# Patient Record
Sex: FEMALE | Race: BLACK OR AFRICAN AMERICAN | NOT HISPANIC OR LATINO | Employment: UNEMPLOYED | RURAL
[De-identification: names, ages, dates, MRNs, and addresses within clinical notes are randomized per-mention and may not be internally consistent; named-entity substitution may affect disease eponyms.]

---

## 2020-11-23 ENCOUNTER — HISTORICAL (OUTPATIENT)
Dept: ADMINISTRATIVE | Facility: HOSPITAL | Age: 20
End: 2020-11-23

## 2020-11-24 LAB — RPR SER-TITR: ABNORMAL {TITER}

## 2021-05-10 ENCOUNTER — HOSPITAL ENCOUNTER (EMERGENCY)
Facility: HOSPITAL | Age: 21
Discharge: HOME | End: 2021-05-11
Payer: MEDICAID

## 2021-05-10 DIAGNOSIS — H65.02 ACUTE SEROUS OTITIS MEDIA OF LEFT EAR, RECURRENCE NOT SPECIFIED: Primary | ICD-10-CM

## 2021-05-10 LAB
B-HCG UR QL: NEGATIVE
CTP QC/QA: YES

## 2021-05-10 PROCEDURE — 99284 EMERGENCY DEPT VISIT MOD MDM: CPT | Mod: 25

## 2021-05-10 PROCEDURE — 99283 EMERGENCY DEPT VISIT LOW MDM: CPT | Mod: ,,, | Performed by: NURSE PRACTITIONER

## 2021-05-10 PROCEDURE — 99283 PR EMERGENCY DEPT VISIT,LEVEL III: ICD-10-PCS | Mod: ,,, | Performed by: NURSE PRACTITIONER

## 2021-05-10 PROCEDURE — 81025 URINE PREGNANCY TEST: CPT | Performed by: NURSE PRACTITIONER

## 2021-05-10 PROCEDURE — 96372 THER/PROPH/DIAG INJ SC/IM: CPT

## 2021-05-10 PROCEDURE — 63600175 PHARM REV CODE 636 W HCPCS: Performed by: NURSE PRACTITIONER

## 2021-05-10 RX ORDER — CEFTRIAXONE 1 G/1
1 INJECTION, POWDER, FOR SOLUTION INTRAMUSCULAR; INTRAVENOUS
Status: COMPLETED | OUTPATIENT
Start: 2021-05-10 | End: 2021-05-10

## 2021-05-10 RX ORDER — CEFDINIR 300 MG/1
300 CAPSULE ORAL 2 TIMES DAILY
Qty: 20 CAPSULE | Refills: 0 | Status: SHIPPED | OUTPATIENT
Start: 2021-05-10 | End: 2021-05-20

## 2021-05-10 RX ORDER — METHYLPREDNISOLONE 4 MG/1
TABLET ORAL
Qty: 1 PACKAGE | Refills: 0 | Status: SHIPPED | OUTPATIENT
Start: 2021-05-10 | End: 2021-05-31

## 2021-05-10 RX ORDER — KETOROLAC TROMETHAMINE 30 MG/ML
30 INJECTION, SOLUTION INTRAMUSCULAR; INTRAVENOUS
Status: COMPLETED | OUTPATIENT
Start: 2021-05-10 | End: 2021-05-10

## 2021-05-10 RX ADMIN — CEFTRIAXONE SODIUM 1 G: 1 INJECTION, POWDER, FOR SOLUTION INTRAMUSCULAR; INTRAVENOUS at 11:05

## 2021-05-10 RX ADMIN — KETOROLAC TROMETHAMINE 30 MG: 30 INJECTION, SOLUTION INTRAMUSCULAR; INTRAVENOUS at 11:05

## 2021-05-11 VITALS
SYSTOLIC BLOOD PRESSURE: 134 MMHG | BODY MASS INDEX: 23.92 KG/M2 | DIASTOLIC BLOOD PRESSURE: 90 MMHG | OXYGEN SATURATION: 99 % | WEIGHT: 130 LBS | TEMPERATURE: 99 F | RESPIRATION RATE: 16 BRPM | HEIGHT: 62 IN | HEART RATE: 68 BPM

## 2021-07-06 ENCOUNTER — OFFICE VISIT (OUTPATIENT)
Dept: FAMILY MEDICINE | Facility: CLINIC | Age: 21
End: 2021-07-06
Payer: MEDICAID

## 2021-07-06 VITALS
DIASTOLIC BLOOD PRESSURE: 98 MMHG | SYSTOLIC BLOOD PRESSURE: 153 MMHG | BODY MASS INDEX: 24.36 KG/M2 | RESPIRATION RATE: 18 BRPM | WEIGHT: 132.38 LBS | HEART RATE: 83 BPM | OXYGEN SATURATION: 100 % | HEIGHT: 62 IN | TEMPERATURE: 98 F

## 2021-07-06 DIAGNOSIS — J32.9 SINUSITIS, UNSPECIFIED CHRONICITY, UNSPECIFIED LOCATION: Primary | ICD-10-CM

## 2021-07-06 PROCEDURE — 96372 PR INJECTION,THERAP/PROPH/DIAG2ST, IM OR SUBCUT: ICD-10-PCS | Mod: ,,, | Performed by: FAMILY MEDICINE

## 2021-07-06 PROCEDURE — 96372 THER/PROPH/DIAG INJ SC/IM: CPT | Mod: ,,, | Performed by: FAMILY MEDICINE

## 2021-07-06 PROCEDURE — 99214 OFFICE O/P EST MOD 30 MIN: CPT | Mod: 25,,, | Performed by: FAMILY MEDICINE

## 2021-07-06 PROCEDURE — 99214 PR OFFICE/OUTPT VISIT, EST, LEVL IV, 30-39 MIN: ICD-10-PCS | Mod: 25,,, | Performed by: FAMILY MEDICINE

## 2021-07-06 RX ORDER — CEFTRIAXONE 1 G/1
1 INJECTION, POWDER, FOR SOLUTION INTRAMUSCULAR; INTRAVENOUS
Status: COMPLETED | OUTPATIENT
Start: 2021-07-06 | End: 2021-07-06

## 2021-07-06 RX ORDER — DEXAMETHASONE SODIUM PHOSPHATE 4 MG/ML
4 INJECTION, SOLUTION INTRA-ARTICULAR; INTRALESIONAL; INTRAMUSCULAR; INTRAVENOUS; SOFT TISSUE
Status: COMPLETED | OUTPATIENT
Start: 2021-07-06 | End: 2021-07-06

## 2021-07-06 RX ORDER — PREDNISONE 20 MG/1
20 TABLET ORAL DAILY
Qty: 5 TABLET | Refills: 0 | Status: SHIPPED | OUTPATIENT
Start: 2021-07-06 | End: 2021-07-11

## 2021-07-06 RX ORDER — AMOXICILLIN AND CLAVULANATE POTASSIUM 875; 125 MG/1; MG/1
1 TABLET, FILM COATED ORAL 2 TIMES DAILY
Qty: 14 TABLET | Refills: 0 | Status: SHIPPED | OUTPATIENT
Start: 2021-07-06 | End: 2021-07-13

## 2021-07-06 RX ADMIN — CEFTRIAXONE 1 G: 1 INJECTION, POWDER, FOR SOLUTION INTRAMUSCULAR; INTRAVENOUS at 02:07

## 2021-07-06 RX ADMIN — DEXAMETHASONE SODIUM PHOSPHATE 4 MG: 4 INJECTION, SOLUTION INTRA-ARTICULAR; INTRALESIONAL; INTRAMUSCULAR; INTRAVENOUS; SOFT TISSUE at 02:07

## 2021-10-16 ENCOUNTER — OFFICE VISIT (OUTPATIENT)
Dept: FAMILY MEDICINE | Facility: CLINIC | Age: 21
End: 2021-10-16
Payer: MEDICAID

## 2021-10-16 VITALS
TEMPERATURE: 98 F | HEART RATE: 85 BPM | SYSTOLIC BLOOD PRESSURE: 120 MMHG | WEIGHT: 132 LBS | HEIGHT: 62 IN | BODY MASS INDEX: 24.29 KG/M2 | RESPIRATION RATE: 20 BRPM | DIASTOLIC BLOOD PRESSURE: 70 MMHG | OXYGEN SATURATION: 98 %

## 2021-10-16 DIAGNOSIS — L03.011 PARONYCHIA OF FINGER OF RIGHT HAND: Primary | ICD-10-CM

## 2021-10-16 DIAGNOSIS — L03.011 CELLULITIS OF FINGER OF RIGHT HAND: ICD-10-CM

## 2021-10-16 PROCEDURE — 99051 MED SERV EVE/WKEND/HOLIDAY: CPT | Mod: ,,, | Performed by: NURSE PRACTITIONER

## 2021-10-16 PROCEDURE — 99051 PR MEDICAL SERVICES, EVE/WKEND/HOLIDAY: ICD-10-PCS | Mod: ,,, | Performed by: NURSE PRACTITIONER

## 2021-10-16 PROCEDURE — 99213 PR OFFICE/OUTPT VISIT, EST, LEVL III, 20-29 MIN: ICD-10-PCS | Mod: ,,, | Performed by: NURSE PRACTITIONER

## 2021-10-16 PROCEDURE — 99213 OFFICE O/P EST LOW 20 MIN: CPT | Mod: ,,, | Performed by: NURSE PRACTITIONER

## 2021-10-16 RX ORDER — MUPIROCIN 20 MG/G
OINTMENT TOPICAL 3 TIMES DAILY
Qty: 22 G | Refills: 0 | Status: SHIPPED | OUTPATIENT
Start: 2021-10-16 | End: 2022-03-29

## 2021-10-16 RX ORDER — CEPHALEXIN 500 MG/1
500 CAPSULE ORAL EVERY 8 HOURS
Qty: 21 CAPSULE | Refills: 0 | Status: SHIPPED | OUTPATIENT
Start: 2021-10-16 | End: 2021-10-23

## 2021-11-11 ENCOUNTER — OFFICE VISIT (OUTPATIENT)
Dept: OBSTETRICS AND GYNECOLOGY | Facility: CLINIC | Age: 21
End: 2021-11-11
Payer: MEDICAID

## 2021-11-11 VITALS
DIASTOLIC BLOOD PRESSURE: 52 MMHG | HEIGHT: 62 IN | OXYGEN SATURATION: 98 % | BODY MASS INDEX: 24.11 KG/M2 | SYSTOLIC BLOOD PRESSURE: 164 MMHG | HEART RATE: 86 BPM | WEIGHT: 131 LBS | RESPIRATION RATE: 16 BRPM

## 2021-11-11 DIAGNOSIS — N60.12 FIBROCYSTIC BREAST CHANGES OF BOTH BREASTS: ICD-10-CM

## 2021-11-11 DIAGNOSIS — R10.2 PELVIC PAIN: ICD-10-CM

## 2021-11-11 DIAGNOSIS — Z72.51 HIGH RISK SEXUAL BEHAVIOR, UNSPECIFIED TYPE: ICD-10-CM

## 2021-11-11 DIAGNOSIS — Z01.419 ENCOUNTER FOR ANNUAL ROUTINE GYNECOLOGICAL EXAMINATION: Primary | ICD-10-CM

## 2021-11-11 DIAGNOSIS — N60.11 FIBROCYSTIC BREAST CHANGES OF BOTH BREASTS: ICD-10-CM

## 2021-11-11 LAB
CANDIDA SPECIES: NEGATIVE
GARDNERELLA: POSITIVE
HIV 1+O+2 AB SERPL QL: NORMAL
SYPHILIS AB INTERPRETATION: REACTIVE
TRICHOMONAS: NEGATIVE

## 2021-11-11 PROCEDURE — 87389 HIV 1 / 2 ANTIBODY: ICD-10-PCS | Mod: ,,, | Performed by: CLINICAL MEDICAL LABORATORY

## 2021-11-11 PROCEDURE — 87510 GARDNER VAG DNA DIR PROBE: CPT | Mod: ,,, | Performed by: CLINICAL MEDICAL LABORATORY

## 2021-11-11 PROCEDURE — 87591 CHLAMYDIA/GONORRHOEAE(GC), PCR: ICD-10-PCS | Mod: ,,, | Performed by: CLINICAL MEDICAL LABORATORY

## 2021-11-11 PROCEDURE — 87660 TRICHOMONAS VAGIN DIR PROBE: CPT | Mod: ,,, | Performed by: CLINICAL MEDICAL LABORATORY

## 2021-11-11 PROCEDURE — 87510 BACTERIAL VAGINOSIS: ICD-10-PCS | Mod: ,,, | Performed by: CLINICAL MEDICAL LABORATORY

## 2021-11-11 PROCEDURE — 86696 HERPES SIMPLEX TYPE 2 TEST: CPT | Mod: ,,, | Performed by: CLINICAL MEDICAL LABORATORY

## 2021-11-11 PROCEDURE — 86592 RPR: ICD-10-PCS | Mod: ,,, | Performed by: CLINICAL MEDICAL LABORATORY

## 2021-11-11 PROCEDURE — 86694 HERPES SIMPLEX 1 & 2 IGM: ICD-10-PCS | Mod: ,,, | Performed by: CLINICAL MEDICAL LABORATORY

## 2021-11-11 PROCEDURE — 86592 SYPHILIS TEST NON-TREP QUAL: CPT | Mod: ,,, | Performed by: CLINICAL MEDICAL LABORATORY

## 2021-11-11 PROCEDURE — 87660 BACTERIAL VAGINOSIS: ICD-10-PCS | Mod: ,,, | Performed by: CLINICAL MEDICAL LABORATORY

## 2021-11-11 PROCEDURE — 87389 HIV-1 AG W/HIV-1&-2 AB AG IA: CPT | Mod: ,,, | Performed by: CLINICAL MEDICAL LABORATORY

## 2021-11-11 PROCEDURE — 87480 CANDIDA DNA DIR PROBE: CPT | Mod: ,,, | Performed by: CLINICAL MEDICAL LABORATORY

## 2021-11-11 PROCEDURE — 86694 HERPES SIMPLEX NES ANTBDY: CPT | Mod: ,,, | Performed by: CLINICAL MEDICAL LABORATORY

## 2021-11-11 PROCEDURE — 86780 TREPONEMA PALLIDUM (SYPHILIS) ANTIBODY: ICD-10-PCS | Mod: ,,, | Performed by: CLINICAL MEDICAL LABORATORY

## 2021-11-11 PROCEDURE — 87591 N.GONORRHOEAE DNA AMP PROB: CPT | Mod: ,,, | Performed by: CLINICAL MEDICAL LABORATORY

## 2021-11-11 PROCEDURE — 87480 BACTERIAL VAGINOSIS: ICD-10-PCS | Mod: ,,, | Performed by: CLINICAL MEDICAL LABORATORY

## 2021-11-11 PROCEDURE — 87491 CHLAMYDIA/GONORRHOEAE(GC), PCR: ICD-10-PCS | Mod: ,,, | Performed by: CLINICAL MEDICAL LABORATORY

## 2021-11-11 PROCEDURE — 99395 PR PREVENTIVE VISIT,EST,18-39: ICD-10-PCS | Mod: ,,, | Performed by: ADVANCED PRACTICE MIDWIFE

## 2021-11-11 PROCEDURE — 86695 HERPES SIMPLEX 1 & 2 IGG: ICD-10-PCS | Mod: ,,, | Performed by: CLINICAL MEDICAL LABORATORY

## 2021-11-11 PROCEDURE — 87491 CHLMYD TRACH DNA AMP PROBE: CPT | Mod: ,,, | Performed by: CLINICAL MEDICAL LABORATORY

## 2021-11-11 PROCEDURE — 99395 PREV VISIT EST AGE 18-39: CPT | Mod: ,,, | Performed by: ADVANCED PRACTICE MIDWIFE

## 2021-11-11 PROCEDURE — 86780 TREPONEMA PALLIDUM: CPT | Mod: ,,, | Performed by: CLINICAL MEDICAL LABORATORY

## 2021-11-11 PROCEDURE — 86696 HERPES SIMPLEX 1 & 2 IGG: ICD-10-PCS | Mod: ,,, | Performed by: CLINICAL MEDICAL LABORATORY

## 2021-11-11 PROCEDURE — 86695 HERPES SIMPLEX TYPE 1 TEST: CPT | Mod: ,,, | Performed by: CLINICAL MEDICAL LABORATORY

## 2021-11-11 PROCEDURE — 88142 CYTOPATH C/V THIN LAYER: CPT | Mod: GCY | Performed by: ADVANCED PRACTICE MIDWIFE

## 2021-11-12 LAB
CHLAMYDIA BY PCR: NEGATIVE
N. GONORRHOEAE (GC) BY PCR: NEGATIVE
RPR SER-TITR: ABNORMAL {TITER}

## 2021-11-15 LAB
GH SERPL-MCNC: NORMAL NG/ML
INSULIN SERPL-ACNC: NORMAL U[IU]/ML
LAB AP CLINICAL INFORMATION: NORMAL
LAB AP GYN INTERPRETATION: NEGATIVE
LAB AP PAP DISCLAIMER COMMENTS: NORMAL
RENIN PLAS-CCNC: NORMAL NG/ML/H

## 2021-11-17 ENCOUNTER — HOSPITAL ENCOUNTER (OUTPATIENT)
Dept: RADIOLOGY | Facility: HOSPITAL | Age: 21
Discharge: HOME | End: 2021-11-17
Attending: ADVANCED PRACTICE MIDWIFE
Payer: MEDICAID

## 2021-11-17 ENCOUNTER — TELEPHONE (OUTPATIENT)
Dept: OBSTETRICS AND GYNECOLOGY | Facility: CLINIC | Age: 21
End: 2021-11-17
Payer: MEDICAID

## 2021-11-17 DIAGNOSIS — R10.2 PELVIC PAIN: ICD-10-CM

## 2021-11-17 LAB
HSV IGM SER QL IA: NEGATIVE
HSV TYPE 1 AB IGG INDEX: 0.06
HSV TYPE 2 AB IGG INDEX: 9.21
HSV1 IGG SER QL: NEGATIVE
HSV2 IGG SER QL: POSITIVE

## 2021-11-17 PROCEDURE — 76856 US PELVIS COMPLETE NON OB: ICD-10-PCS | Mod: 26,,, | Performed by: RADIOLOGY

## 2021-11-17 PROCEDURE — 76856 US EXAM PELVIC COMPLETE: CPT | Mod: 26,,, | Performed by: RADIOLOGY

## 2021-11-17 PROCEDURE — 76856 US EXAM PELVIC COMPLETE: CPT | Mod: TC

## 2021-12-02 ENCOUNTER — TELEPHONE (OUTPATIENT)
Dept: OBSTETRICS AND GYNECOLOGY | Facility: CLINIC | Age: 21
End: 2021-12-02
Payer: MEDICAID

## 2021-12-22 ENCOUNTER — TELEPHONE (OUTPATIENT)
Dept: OBSTETRICS AND GYNECOLOGY | Facility: CLINIC | Age: 21
End: 2021-12-22
Payer: MEDICAID

## 2021-12-25 ENCOUNTER — HOSPITAL ENCOUNTER (EMERGENCY)
Facility: HOSPITAL | Age: 21
Discharge: HOME | End: 2021-12-25
Payer: OTHER GOVERNMENT

## 2021-12-25 VITALS
SYSTOLIC BLOOD PRESSURE: 135 MMHG | TEMPERATURE: 99 F | OXYGEN SATURATION: 100 % | DIASTOLIC BLOOD PRESSURE: 98 MMHG | RESPIRATION RATE: 18 BRPM | HEIGHT: 62 IN | WEIGHT: 125 LBS | HEART RATE: 104 BPM | BODY MASS INDEX: 23 KG/M2

## 2021-12-25 DIAGNOSIS — L03.011 CELLULITIS OF FINGER OF RIGHT HAND: ICD-10-CM

## 2021-12-25 DIAGNOSIS — U07.1 COVID-19: Primary | ICD-10-CM

## 2021-12-25 LAB
FLUAV AG UPPER RESP QL IA.RAPID: NEGATIVE
FLUBV AG UPPER RESP QL IA.RAPID: NEGATIVE
RAPID GROUP A STREP: NEGATIVE
SARS-COV+SARS-COV-2 AG RESP QL IA.RAPID: POSITIVE

## 2021-12-25 PROCEDURE — 99282 PR EMERGENCY DEPT VISIT,LEVEL II: ICD-10-PCS | Mod: ,,, | Performed by: NURSE PRACTITIONER

## 2021-12-25 PROCEDURE — 87428 SARSCOV & INF VIR A&B AG IA: CPT | Performed by: NURSE PRACTITIONER

## 2021-12-25 PROCEDURE — 99282 EMERGENCY DEPT VISIT SF MDM: CPT | Mod: ,,, | Performed by: NURSE PRACTITIONER

## 2021-12-25 PROCEDURE — 87880 STREP A ASSAY W/OPTIC: CPT | Performed by: NURSE PRACTITIONER

## 2021-12-25 PROCEDURE — 99282 EMERGENCY DEPT VISIT SF MDM: CPT

## 2022-03-16 ENCOUNTER — OFFICE VISIT (OUTPATIENT)
Dept: FAMILY MEDICINE | Facility: CLINIC | Age: 22
End: 2022-03-16
Payer: MEDICAID

## 2022-03-16 VITALS
SYSTOLIC BLOOD PRESSURE: 141 MMHG | OXYGEN SATURATION: 100 % | HEART RATE: 103 BPM | RESPIRATION RATE: 18 BRPM | TEMPERATURE: 99 F | WEIGHT: 125 LBS | DIASTOLIC BLOOD PRESSURE: 82 MMHG | HEIGHT: 62 IN | BODY MASS INDEX: 23 KG/M2

## 2022-03-16 DIAGNOSIS — H10.9 CONJUNCTIVITIS OF RIGHT EYE, UNSPECIFIED CONJUNCTIVITIS TYPE: Primary | ICD-10-CM

## 2022-03-16 PROCEDURE — 1160F PR REVIEW ALL MEDS BY PRESCRIBER/CLIN PHARMACIST DOCUMENTED: ICD-10-PCS | Mod: CPTII,,, | Performed by: FAMILY MEDICINE

## 2022-03-16 PROCEDURE — 3008F PR BODY MASS INDEX (BMI) DOCUMENTED: ICD-10-PCS | Mod: CPTII,,, | Performed by: FAMILY MEDICINE

## 2022-03-16 PROCEDURE — 99214 OFFICE O/P EST MOD 30 MIN: CPT | Mod: ,,, | Performed by: FAMILY MEDICINE

## 2022-03-16 PROCEDURE — 99214 PR OFFICE/OUTPT VISIT, EST, LEVL IV, 30-39 MIN: ICD-10-PCS | Mod: ,,, | Performed by: FAMILY MEDICINE

## 2022-03-16 PROCEDURE — 1159F MED LIST DOCD IN RCRD: CPT | Mod: CPTII,,, | Performed by: FAMILY MEDICINE

## 2022-03-16 PROCEDURE — 1160F RVW MEDS BY RX/DR IN RCRD: CPT | Mod: CPTII,,, | Performed by: FAMILY MEDICINE

## 2022-03-16 PROCEDURE — 3008F BODY MASS INDEX DOCD: CPT | Mod: CPTII,,, | Performed by: FAMILY MEDICINE

## 2022-03-16 PROCEDURE — 1159F PR MEDICATION LIST DOCUMENTED IN MEDICAL RECORD: ICD-10-PCS | Mod: CPTII,,, | Performed by: FAMILY MEDICINE

## 2022-03-16 PROCEDURE — 3077F SYST BP >= 140 MM HG: CPT | Mod: CPTII,,, | Performed by: FAMILY MEDICINE

## 2022-03-16 PROCEDURE — 3079F DIAST BP 80-89 MM HG: CPT | Mod: CPTII,,, | Performed by: FAMILY MEDICINE

## 2022-03-16 PROCEDURE — 3079F PR MOST RECENT DIASTOLIC BLOOD PRESSURE 80-89 MM HG: ICD-10-PCS | Mod: CPTII,,, | Performed by: FAMILY MEDICINE

## 2022-03-16 PROCEDURE — 3077F PR MOST RECENT SYSTOLIC BLOOD PRESSURE >= 140 MM HG: ICD-10-PCS | Mod: CPTII,,, | Performed by: FAMILY MEDICINE

## 2022-03-16 RX ORDER — OLOPATADINE HYDROCHLORIDE 1 MG/ML
1 SOLUTION/ DROPS OPHTHALMIC 2 TIMES DAILY
Qty: 5 ML | Refills: 0 | Status: SHIPPED | OUTPATIENT
Start: 2022-03-16 | End: 2022-03-29

## 2022-03-16 RX ORDER — POLYMYXIN B SULFATE AND TRIMETHOPRIM 1; 10000 MG/ML; [USP'U]/ML
1 SOLUTION OPHTHALMIC EVERY 6 HOURS
Qty: 10 ML | Refills: 0 | Status: SHIPPED | OUTPATIENT
Start: 2022-03-16 | End: 2022-03-21

## 2022-03-16 NOTE — PROGRESS NOTES
Subjective:       Patient ID: Kavon Denise is a 22 y.o. female.    Chief Complaint: Eye Pain (Right eye irritation for 5 days)    Right eye redness, drainage, irritation x 5 days.     Review of Systems   Constitutional: Negative for activity change, appetite change, chills, diaphoresis, fatigue, fever and unexpected weight change.   HENT: Negative for nasal congestion, dental problem, drooling, ear discharge, ear pain, facial swelling, hearing loss, mouth sores, nosebleeds, postnasal drip, rhinorrhea, sinus pressure/congestion, sneezing, sore throat, tinnitus, trouble swallowing, voice change and goiter.    Eyes: Positive for pain, discharge and redness. Negative for photophobia, itching and visual disturbance.   Respiratory: Negative for apnea, cough, choking, chest tightness, shortness of breath, wheezing and stridor.    Cardiovascular: Negative for chest pain, palpitations, leg swelling and claudication.   Gastrointestinal: Negative for abdominal distention, abdominal pain, anal bleeding, blood in stool, change in bowel habit, constipation, diarrhea, nausea, vomiting and change in bowel habit.   Endocrine: Negative for cold intolerance, heat intolerance, polydipsia, polyphagia and polyuria.   Genitourinary: Negative for bladder incontinence, decreased urine volume, difficulty urinating, dysuria, enuresis, flank pain, frequency, hematuria, nocturia, pelvic pain and urgency.   Musculoskeletal: Negative for arthralgias, back pain, gait problem, joint swelling, leg pain, myalgias, neck pain, neck stiffness and joint deformity.   Integumentary:  Negative for pallor, rash, wound, breast mass and breast tenderness.   Allergic/Immunologic: Negative for environmental allergies, food allergies and immunocompromised state.   Neurological: Negative for dizziness, vertigo, tremors, seizures, syncope, facial asymmetry, speech difficulty, weakness, light-headedness, numbness, headaches, disturbances in coordination, memory  loss and coordination difficulties.   Hematological: Negative for adenopathy. Does not bruise/bleed easily.   Psychiatric/Behavioral: Negative for agitation, behavioral problems, confusion, decreased concentration, dysphoric mood, hallucinations, self-injury, sleep disturbance and suicidal ideas. The patient is not nervous/anxious and is not hyperactive.    Breast: Negative for mass and tenderness        Objective:      Physical Exam  Vitals reviewed.   Constitutional:       Appearance: Normal appearance.   HENT:      Head: Normocephalic and atraumatic.      Right Ear: Tympanic membrane, ear canal and external ear normal.      Left Ear: Tympanic membrane, ear canal and external ear normal.      Nose: Nose normal.      Mouth/Throat:      Mouth: Mucous membranes are moist.      Pharynx: Oropharynx is clear.   Eyes:      General:         Right eye: Discharge present.      Extraocular Movements: Extraocular movements intact.      Pupils: Pupils are equal, round, and reactive to light.   Cardiovascular:      Rate and Rhythm: Normal rate and regular rhythm.      Pulses: Normal pulses.      Heart sounds: Normal heart sounds.   Pulmonary:      Effort: Pulmonary effort is normal.      Breath sounds: Normal breath sounds.   Abdominal:      General: Bowel sounds are normal.      Palpations: Abdomen is soft.   Musculoskeletal:         General: Normal range of motion.      Cervical back: Normal range of motion and neck supple.   Skin:     General: Skin is warm and dry.   Neurological:      General: No focal deficit present.      Mental Status: She is alert. Mental status is at baseline.   Psychiatric:         Mood and Affect: Mood normal.         Behavior: Behavior normal.         Thought Content: Thought content normal.         Judgment: Judgment normal.         Assessment:       1. Conjunctivitis of right eye, unspecified conjunctivitis type        Plan:     Conjunctivitis of right eye, unspecified conjunctivitis type    Other  orders  -     polymyxin B sulf-trimethoprim (POLYTRIM) 10,000 unit- 1 mg/mL Drop; Place 1 drop into the right eye every 6 (six) hours. for 5 days  Dispense: 10 mL; Refill: 0  -     olopatadine (PATANOL) 0.1 % ophthalmic solution; Place 1 drop into the right eye 2 (two) times daily. for 5 days  Dispense: 5 mL; Refill: 0

## 2022-03-29 ENCOUNTER — HOSPITAL ENCOUNTER (EMERGENCY)
Facility: HOSPITAL | Age: 22
Discharge: HOME | End: 2022-03-29
Payer: MEDICAID

## 2022-03-29 VITALS
DIASTOLIC BLOOD PRESSURE: 94 MMHG | HEART RATE: 81 BPM | HEIGHT: 62 IN | WEIGHT: 130 LBS | SYSTOLIC BLOOD PRESSURE: 136 MMHG | BODY MASS INDEX: 23.92 KG/M2 | OXYGEN SATURATION: 99 % | TEMPERATURE: 98 F | RESPIRATION RATE: 16 BRPM

## 2022-03-29 DIAGNOSIS — A52.71: Primary | ICD-10-CM

## 2022-03-29 DIAGNOSIS — L03.011 CELLULITIS OF FINGER OF RIGHT HAND: ICD-10-CM

## 2022-03-29 PROCEDURE — 99282 EMERGENCY DEPT VISIT SF MDM: CPT

## 2022-03-29 PROCEDURE — 99283 EMERGENCY DEPT VISIT LOW MDM: CPT | Mod: ,,, | Performed by: NURSE PRACTITIONER

## 2022-03-29 PROCEDURE — 99283 PR EMERGENCY DEPT VISIT,LEVEL III: ICD-10-PCS | Mod: ,,, | Performed by: NURSE PRACTITIONER

## 2022-03-29 RX ORDER — NEOMYCIN SULFATE, POLYMYXIN B SULFATE AND DEXAMETHASONE 1; 3.5; 1 MG/ML; MG/ML; [USP'U]/ML
1 SUSPENSION OPHTHALMIC
COMMUNITY
Start: 2022-03-21 | End: 2024-01-17 | Stop reason: ALTCHOICE

## 2022-03-29 NOTE — ED PROVIDER NOTES
Encounter Date: 3/29/2022       History     Chief Complaint   Patient presents with    Eye Problem     Patient presents to ER with complaint of right eye pain.  Patient states the symptoms started early March.  She has been [facility] by [facility].  She states she was given drops to use both times and both times the symptoms worsened after using the drops.  Chart review reveals reactive RPR in November of 2021.  Right is red and draining clear fluid and is irritated.  Denies fever.   Patient does not recall if she was treated for syphilis at that time.      The history is provided by the patient. No  was used.     Review of patient's allergies indicates:  No Known Allergies  History reviewed. No pertinent past medical history.  History reviewed. No pertinent surgical history.  History reviewed. No pertinent family history.  Social History     Tobacco Use    Smoking status: Current Every Day Smoker     Types: Cigars    Smokeless tobacco: Never Used   Substance Use Topics    Alcohol use: Not Currently    Drug use: Not Currently     Review of Systems   Eyes: Positive for pain, discharge, redness, itching and visual disturbance (blurry).   All other systems reviewed and are negative.      Physical Exam     Initial Vitals [03/29/22 1244]   BP Pulse Resp Temp SpO2   (!) 136/94 81 16 98.3 °F (36.8 °C) 99 %      MAP       --         Physical Exam    Nursing note and vitals reviewed.  Constitutional: She appears well-developed and well-nourished.   HENT:   Head: Normocephalic.   Right Ear: Hearing, tympanic membrane, external ear and ear canal normal.   Left Ear: Hearing, tympanic membrane, external ear and ear canal normal.   Nose: Nose normal.   Mouth/Throat: Uvula is midline, oropharynx is clear and moist and mucous membranes are normal.   Eyes: Conjunctivae, EOM and lids are normal. Pupils are equal, round, and reactive to light. Lids are everted and swept, no foreign bodies found.  Right eye exhibits discharge.   Neck: Neck supple.   Normal range of motion.  Cardiovascular: Normal rate, regular rhythm, normal heart sounds and intact distal pulses.   Pulmonary/Chest: Breath sounds normal.   Abdominal: Abdomen is soft. Bowel sounds are normal.   Musculoskeletal:         General: Normal range of motion.      Cervical back: Normal range of motion and neck supple.     Neurological: She is alert and oriented to person, place, and time. She has normal strength. GCS score is 15. GCS eye subscore is 4. GCS verbal subscore is 5. GCS motor subscore is 6.   Skin: Skin is warm and dry. Capillary refill takes less than 2 seconds.   Psychiatric: She has a normal mood and affect. Her behavior is normal. Judgment and thought content normal.         Medical Screening Exam   See Full Note    ED Course   Procedures  Labs Reviewed - No data to display       Imaging Results    None          Medications - No data to display  Medical Decision Making:   ED Management:  Infection control called spoke with Teresa.  [facility] called and confirmed diagnosis and treatment of syphilis documented.  In November of 11/21 with last RPR 11/23 with 1:2 ratio.    [facility] consulted Dr Wiggins.  History discussed, recommendation to follow up at Eye Clinic today.                    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

## 2022-06-27 ENCOUNTER — HOSPITAL ENCOUNTER (EMERGENCY)
Facility: HOSPITAL | Age: 22
Discharge: HOME | End: 2022-06-27
Attending: EMERGENCY MEDICINE
Payer: MEDICAID

## 2022-06-27 VITALS
OXYGEN SATURATION: 99 % | RESPIRATION RATE: 17 BRPM | SYSTOLIC BLOOD PRESSURE: 136 MMHG | TEMPERATURE: 99 F | DIASTOLIC BLOOD PRESSURE: 95 MMHG | BODY MASS INDEX: 24.29 KG/M2 | HEIGHT: 62 IN | WEIGHT: 132 LBS | HEART RATE: 78 BPM
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
## 2022-06-27 PROCEDURE — 99283 EMERGENCY DEPT VISIT LOW MDM: CPT | Mod: ,,, | Performed by: EMERGENCY MEDICINE

## 2022-06-27 PROCEDURE — 99283 PR EMERGENCY DEPT VISIT,LEVEL III: ICD-10-PCS | Mod: ,,, | Performed by: EMERGENCY MEDICINE

## 2022-06-27 PROCEDURE — 96372 THER/PROPH/DIAG INJ SC/IM: CPT

## 2022-06-27 PROCEDURE — 63600175 PHARM REV CODE 636 W HCPCS: Performed by: EMERGENCY MEDICINE

## 2022-06-27 PROCEDURE — 99285 EMERGENCY DEPT VISIT HI MDM: CPT | Mod: 25

## 2022-06-27 RX ORDER — ORPHENADRINE CITRATE 30 MG/ML
60 INJECTION INTRAMUSCULAR; INTRAVENOUS
Status: COMPLETED | OUTPATIENT
Start: 2022-06-27 | End: 2022-06-27

## 2022-06-27 RX ADMIN — ORPHENADRINE CITRATE 60 MG: 30 INJECTION INTRAMUSCULAR; INTRAVENOUS at 07:06
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
## 2022-06-28 ENCOUNTER — OFFICE VISIT (OUTPATIENT)
Dept: FAMILY MEDICINE | Facility: CLINIC | Age: 22
End: 2022-06-28
Payer: MEDICAID

## 2022-06-28 VITALS
OXYGEN SATURATION: 99 % | BODY MASS INDEX: 24.29 KG/M2 | WEIGHT: 132 LBS | HEART RATE: 79 BPM | DIASTOLIC BLOOD PRESSURE: 80 MMHG | SYSTOLIC BLOOD PRESSURE: 127 MMHG | HEIGHT: 62 IN | RESPIRATION RATE: 18 BRPM | TEMPERATURE: 100 F

## 2022-06-28 DIAGNOSIS — R82.71 BACTERIURIA: ICD-10-CM

## 2022-06-28 DIAGNOSIS — Z20.2 STD EXPOSURE: Primary | ICD-10-CM

## 2022-06-28 LAB
BILIRUB SERPL-MCNC: ABNORMAL MG/DL
BLOOD URINE, POC: ABNORMAL
CANDIDA SPECIES: POSITIVE
COLOR, POC UA: YELLOW
GARDNERELLA: POSITIVE
GLUCOSE UR QL STRIP: NEGATIVE
HIV 1+O+2 AB SERPL QL: NORMAL
KETONES UR QL STRIP: ABNORMAL
LEUKOCYTE ESTERASE URINE, POC: NEGATIVE
NITRITE, POC UA: NEGATIVE
PH, POC UA: 5.5
PROTEIN, POC: NEGATIVE
SPECIFIC GRAVITY, POC UA: 1.02
SYPHILIS AB INTERPRETATION: REACTIVE
TRICHOMONAS: NEGATIVE
UROBILINOGEN, POC UA: 0.2

## 2022-06-28 PROCEDURE — 87389 HIV-1 AG W/HIV-1&-2 AB AG IA: CPT | Mod: ,,, | Performed by: CLINICAL MEDICAL LABORATORY

## 2022-06-28 PROCEDURE — 87510 BACTERIAL VAGINOSIS: ICD-10-PCS | Mod: ,,, | Performed by: CLINICAL MEDICAL LABORATORY

## 2022-06-28 PROCEDURE — 96372 PR INJECTION,THERAP/PROPH/DIAG2ST, IM OR SUBCUT: ICD-10-PCS | Mod: ,,, | Performed by: NURSE PRACTITIONER

## 2022-06-28 PROCEDURE — 87491 CHLAMYDIA/GONORRHOEAE(GC), PCR: ICD-10-PCS | Mod: ,,, | Performed by: CLINICAL MEDICAL LABORATORY

## 2022-06-28 PROCEDURE — 3079F PR MOST RECENT DIASTOLIC BLOOD PRESSURE 80-89 MM HG: ICD-10-PCS | Mod: CPTII,,, | Performed by: NURSE PRACTITIONER

## 2022-06-28 PROCEDURE — 87660 TRICHOMONAS VAGIN DIR PROBE: CPT | Mod: ,,, | Performed by: CLINICAL MEDICAL LABORATORY

## 2022-06-28 PROCEDURE — 87480 BACTERIAL VAGINOSIS: ICD-10-PCS | Mod: ,,, | Performed by: CLINICAL MEDICAL LABORATORY

## 2022-06-28 PROCEDURE — 86694 HERPES SIMPLEX NES ANTBDY: CPT | Mod: ,,, | Performed by: CLINICAL MEDICAL LABORATORY

## 2022-06-28 PROCEDURE — 87591 CHLAMYDIA/GONORRHOEAE(GC), PCR: ICD-10-PCS | Mod: ,,, | Performed by: CLINICAL MEDICAL LABORATORY

## 2022-06-28 PROCEDURE — 86696 HERPES SIMPLEX TYPE 2 TEST: CPT | Mod: ,,, | Performed by: CLINICAL MEDICAL LABORATORY

## 2022-06-28 PROCEDURE — 1159F PR MEDICATION LIST DOCUMENTED IN MEDICAL RECORD: ICD-10-PCS | Mod: CPTII,,, | Performed by: NURSE PRACTITIONER

## 2022-06-28 PROCEDURE — 86592 RPR: ICD-10-PCS | Mod: ,,, | Performed by: CLINICAL MEDICAL LABORATORY

## 2022-06-28 PROCEDURE — 87389 HIV 1 / 2 ANTIBODY: ICD-10-PCS | Mod: ,,, | Performed by: CLINICAL MEDICAL LABORATORY

## 2022-06-28 PROCEDURE — 86780 TREPONEMA PALLIDUM (SYPHILIS) ANTIBODY: ICD-10-PCS | Mod: ,,, | Performed by: CLINICAL MEDICAL LABORATORY

## 2022-06-28 PROCEDURE — 86696 HERPES SIMPLEX 1 & 2 IGG: ICD-10-PCS | Mod: ,,, | Performed by: CLINICAL MEDICAL LABORATORY

## 2022-06-28 PROCEDURE — 87086 CULTURE, URINE: ICD-10-PCS | Mod: ,,, | Performed by: CLINICAL MEDICAL LABORATORY

## 2022-06-28 PROCEDURE — 99214 OFFICE O/P EST MOD 30 MIN: CPT | Mod: 25,,, | Performed by: NURSE PRACTITIONER

## 2022-06-28 PROCEDURE — 3079F DIAST BP 80-89 MM HG: CPT | Mod: CPTII,,, | Performed by: NURSE PRACTITIONER

## 2022-06-28 PROCEDURE — 1159F MED LIST DOCD IN RCRD: CPT | Mod: CPTII,,, | Performed by: NURSE PRACTITIONER

## 2022-06-28 PROCEDURE — 86694 HERPES SIMPLEX 1 & 2 IGM: ICD-10-PCS | Mod: ,,, | Performed by: CLINICAL MEDICAL LABORATORY

## 2022-06-28 PROCEDURE — 86695 HERPES SIMPLEX 1 & 2 IGG: ICD-10-PCS | Mod: ,,, | Performed by: CLINICAL MEDICAL LABORATORY

## 2022-06-28 PROCEDURE — 96372 THER/PROPH/DIAG INJ SC/IM: CPT | Mod: ,,, | Performed by: NURSE PRACTITIONER

## 2022-06-28 PROCEDURE — 1160F PR REVIEW ALL MEDS BY PRESCRIBER/CLIN PHARMACIST DOCUMENTED: ICD-10-PCS | Mod: CPTII,,, | Performed by: NURSE PRACTITIONER

## 2022-06-28 PROCEDURE — 99214 PR OFFICE/OUTPT VISIT, EST, LEVL IV, 30-39 MIN: ICD-10-PCS | Mod: 25,,, | Performed by: NURSE PRACTITIONER

## 2022-06-28 PROCEDURE — 87491 CHLMYD TRACH DNA AMP PROBE: CPT | Mod: ,,, | Performed by: CLINICAL MEDICAL LABORATORY

## 2022-06-28 PROCEDURE — 86780 TREPONEMA PALLIDUM: CPT | Mod: ,,, | Performed by: CLINICAL MEDICAL LABORATORY

## 2022-06-28 PROCEDURE — 3074F PR MOST RECENT SYSTOLIC BLOOD PRESSURE < 130 MM HG: ICD-10-PCS | Mod: CPTII,,, | Performed by: NURSE PRACTITIONER

## 2022-06-28 PROCEDURE — 3008F PR BODY MASS INDEX (BMI) DOCUMENTED: ICD-10-PCS | Mod: CPTII,,, | Performed by: NURSE PRACTITIONER

## 2022-06-28 PROCEDURE — 86592 SYPHILIS TEST NON-TREP QUAL: CPT | Mod: ,,, | Performed by: CLINICAL MEDICAL LABORATORY

## 2022-06-28 PROCEDURE — 81003 URINALYSIS AUTO W/O SCOPE: CPT | Mod: QW,,, | Performed by: NURSE PRACTITIONER

## 2022-06-28 PROCEDURE — 86695 HERPES SIMPLEX TYPE 1 TEST: CPT | Mod: ,,, | Performed by: CLINICAL MEDICAL LABORATORY

## 2022-06-28 PROCEDURE — 87480 CANDIDA DNA DIR PROBE: CPT | Mod: ,,, | Performed by: CLINICAL MEDICAL LABORATORY

## 2022-06-28 PROCEDURE — 1160F RVW MEDS BY RX/DR IN RCRD: CPT | Mod: CPTII,,, | Performed by: NURSE PRACTITIONER

## 2022-06-28 PROCEDURE — 87591 N.GONORRHOEAE DNA AMP PROB: CPT | Mod: ,,, | Performed by: CLINICAL MEDICAL LABORATORY

## 2022-06-28 PROCEDURE — 3008F BODY MASS INDEX DOCD: CPT | Mod: CPTII,,, | Performed by: NURSE PRACTITIONER

## 2022-06-28 PROCEDURE — 87660 BACTERIAL VAGINOSIS: ICD-10-PCS | Mod: ,,, | Performed by: CLINICAL MEDICAL LABORATORY

## 2022-06-28 PROCEDURE — 81003 POCT URINALYSIS W/O SCOPE: ICD-10-PCS | Mod: QW,,, | Performed by: NURSE PRACTITIONER

## 2022-06-28 PROCEDURE — 87086 URINE CULTURE/COLONY COUNT: CPT | Mod: ,,, | Performed by: CLINICAL MEDICAL LABORATORY

## 2022-06-28 PROCEDURE — 87510 GARDNER VAG DNA DIR PROBE: CPT | Mod: ,,, | Performed by: CLINICAL MEDICAL LABORATORY

## 2022-06-28 PROCEDURE — 3074F SYST BP LT 130 MM HG: CPT | Mod: CPTII,,, | Performed by: NURSE PRACTITIONER

## 2022-06-28 RX ORDER — DOXYCYCLINE 100 MG/1
100 CAPSULE ORAL 2 TIMES DAILY
Qty: 14 CAPSULE | Refills: 0 | Status: SHIPPED | OUTPATIENT
Start: 2022-06-28 | End: 2022-07-05

## 2022-06-28 RX ORDER — CEFTRIAXONE 500 MG/1
500 INJECTION, POWDER, FOR SOLUTION INTRAMUSCULAR; INTRAVENOUS
Status: COMPLETED | OUTPATIENT
Start: 2022-06-28 | End: 2022-06-28

## 2022-06-28 RX ADMIN — CEFTRIAXONE 500 MG: 500 INJECTION, POWDER, FOR SOLUTION INTRAMUSCULAR; INTRAVENOUS at 05:06
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
## 2022-06-29 LAB
CHLAMYDIA BY PCR: NEGATIVE
N. GONORRHOEAE (GC) BY PCR: NEGATIVE
RPR SER-TITR: ABNORMAL {TITER}

## 2022-06-30 LAB
HSV IGM SER QL IA: NEGATIVE
HSV TYPE 1 AB IGG INDEX: 0.21
HSV TYPE 2 AB IGG INDEX: 7.17
HSV1 IGG SER QL: NEGATIVE
HSV2 IGG SER QL: POSITIVE

## 2022-07-01 LAB — UA COMPLETE W REFLEX CULTURE PNL UR: NO GROWTH

## 2022-07-03 ENCOUNTER — TELEPHONE (OUTPATIENT)
Dept: FAMILY MEDICINE | Facility: CLINIC | Age: 22
End: 2022-07-03
Payer: MEDICAID

## 2022-07-03 NOTE — TELEPHONE ENCOUNTER
Identify patient by name and . Pt will try to come in today to discuss lab results.----- Message from VENU Malagon sent at 2022  8:13 AM CDT -----  Call to come in to see me so I can talk to her and decide on treatment plan for syphilis. Thanks

## 2022-07-19 ENCOUNTER — PATIENT MESSAGE (OUTPATIENT)
Dept: FAMILY MEDICINE | Facility: CLINIC | Age: 22
End: 2022-07-19
Payer: MEDICAID

## 2022-09-26 ENCOUNTER — LAB VISIT (OUTPATIENT)
Dept: PRIMARY CARE CLINIC | Facility: CLINIC | Age: 22
End: 2022-09-26

## 2022-09-26 DIAGNOSIS — Z11.1 SCREENING-PULMONARY TB: Primary | ICD-10-CM

## 2022-09-26 DIAGNOSIS — Z02.83 ENCOUNTER FOR EMPLOYMENT-RELATED DRUG TESTING: ICD-10-CM

## 2022-09-26 PROCEDURE — 86580 POCT TB SKIN TEST: ICD-10-PCS | Mod: ,,, | Performed by: NURSE PRACTITIONER

## 2022-09-26 PROCEDURE — 86580 TB INTRADERMAL TEST: CPT | Mod: ,,, | Performed by: NURSE PRACTITIONER

## 2022-09-26 PROCEDURE — 99000 SPECIMEN HANDLING OFFICE-LAB: CPT | Mod: ,,, | Performed by: NURSE PRACTITIONER

## 2022-09-26 PROCEDURE — 99000 PR URINE DRUG SCREEN COLLECTION: ICD-10-PCS | Mod: ,,, | Performed by: NURSE PRACTITIONER

## 2022-09-26 NOTE — PROGRESS NOTES
Subjective:       Patient ID: Kavon Denise is a 22 y.o. female.    Chief Complaint: No chief complaint on file.    HPI  Review of Systems      Objective:      Physical Exam    Assessment:       Problem List Items Addressed This Visit    None  Visit Diagnoses       Screening-pulmonary TB    -  Primary    Relevant Orders    POCT TB Skin Test Read    Encounter for employment-related drug testing                  Plan:       Drug testing only  TB skin test only

## 2022-09-28 LAB
TB INDURATION - 48 HR READ: NORMAL
TB INDURATION - 72 HR READ: NORMAL
TB SKIN TEST - 48 HR READ: NEGATIVE
TB SKIN TEST - 72 HR READ: NORMAL

## 2022-10-13 ENCOUNTER — LAB VISIT (OUTPATIENT)
Dept: PRIMARY CARE CLINIC | Facility: CLINIC | Age: 22
End: 2022-10-13

## 2022-10-13 DIAGNOSIS — Z23 IMMUNIZATION DUE: Primary | ICD-10-CM

## 2022-10-13 PROCEDURE — 90471 IMMUNIZATION ADMIN: CPT | Mod: ,,, | Performed by: NURSE PRACTITIONER

## 2022-10-13 PROCEDURE — 90471 FLU VACCINE (QUAD) GREATER THAN OR EQUAL TO 3YO PRESERVATIVE FREE IM: ICD-10-PCS | Mod: ,,, | Performed by: NURSE PRACTITIONER

## 2022-10-13 PROCEDURE — 90686 FLU VACCINE (QUAD) GREATER THAN OR EQUAL TO 3YO PRESERVATIVE FREE IM: ICD-10-PCS | Mod: ,,, | Performed by: NURSE PRACTITIONER

## 2022-10-13 PROCEDURE — 90686 IIV4 VACC NO PRSV 0.5 ML IM: CPT | Mod: ,,, | Performed by: NURSE PRACTITIONER

## 2022-11-17 ENCOUNTER — OFFICE VISIT (OUTPATIENT)
Dept: FAMILY MEDICINE | Facility: CLINIC | Age: 22
End: 2022-11-17
Payer: MEDICAID

## 2022-11-17 VITALS
HEART RATE: 78 BPM | OXYGEN SATURATION: 98 % | TEMPERATURE: 98 F | DIASTOLIC BLOOD PRESSURE: 81 MMHG | BODY MASS INDEX: 24.29 KG/M2 | SYSTOLIC BLOOD PRESSURE: 117 MMHG | WEIGHT: 132 LBS | RESPIRATION RATE: 18 BRPM | HEIGHT: 62 IN

## 2022-11-17 DIAGNOSIS — R30.0 DYSURIA: ICD-10-CM

## 2022-11-17 DIAGNOSIS — J06.9 UPPER RESPIRATORY TRACT INFECTION, UNSPECIFIED TYPE: Primary | ICD-10-CM

## 2022-11-17 LAB
BILIRUB SERPL-MCNC: NEGATIVE MG/DL
BLOOD URINE, POC: NEGATIVE
COLOR, POC UA: YELLOW
GLUCOSE UR QL STRIP: NEGATIVE
KETONES UR QL STRIP: NEGATIVE
LEUKOCYTE ESTERASE URINE, POC: NEGATIVE
NITRITE, POC UA: NEGATIVE
PH, POC UA: 5.5
PROTEIN, POC: NORMAL
SPECIFIC GRAVITY, POC UA: >=1.03
UROBILINOGEN, POC UA: 0.2

## 2022-11-17 PROCEDURE — 99213 OFFICE O/P EST LOW 20 MIN: CPT | Mod: ,,, | Performed by: NURSE PRACTITIONER

## 2022-11-17 PROCEDURE — 99213 PR OFFICE/OUTPT VISIT, EST, LEVL III, 20-29 MIN: ICD-10-PCS | Mod: ,,, | Performed by: NURSE PRACTITIONER

## 2022-11-17 PROCEDURE — 81003 URINALYSIS AUTO W/O SCOPE: CPT | Mod: QW,,, | Performed by: NURSE PRACTITIONER

## 2022-11-17 PROCEDURE — 1160F RVW MEDS BY RX/DR IN RCRD: CPT | Mod: CPTII,,, | Performed by: NURSE PRACTITIONER

## 2022-11-17 PROCEDURE — 3079F DIAST BP 80-89 MM HG: CPT | Mod: CPTII,,, | Performed by: NURSE PRACTITIONER

## 2022-11-17 PROCEDURE — 3074F PR MOST RECENT SYSTOLIC BLOOD PRESSURE < 130 MM HG: ICD-10-PCS | Mod: CPTII,,, | Performed by: NURSE PRACTITIONER

## 2022-11-17 PROCEDURE — 81003 URINALYSIS AUTO W/O SCOPE: CPT | Mod: QW,RHCUB | Performed by: NURSE PRACTITIONER

## 2022-11-17 PROCEDURE — 81003 PR URINALYSIS, AUTO, W/O SCOPE: ICD-10-PCS | Mod: QW,,, | Performed by: NURSE PRACTITIONER

## 2022-11-17 PROCEDURE — 3008F PR BODY MASS INDEX (BMI) DOCUMENTED: ICD-10-PCS | Mod: CPTII,,, | Performed by: NURSE PRACTITIONER

## 2022-11-17 PROCEDURE — 3074F SYST BP LT 130 MM HG: CPT | Mod: CPTII,,, | Performed by: NURSE PRACTITIONER

## 2022-11-17 PROCEDURE — 1159F MED LIST DOCD IN RCRD: CPT | Mod: CPTII,,, | Performed by: NURSE PRACTITIONER

## 2022-11-17 PROCEDURE — 1160F PR REVIEW ALL MEDS BY PRESCRIBER/CLIN PHARMACIST DOCUMENTED: ICD-10-PCS | Mod: CPTII,,, | Performed by: NURSE PRACTITIONER

## 2022-11-17 PROCEDURE — 3008F BODY MASS INDEX DOCD: CPT | Mod: CPTII,,, | Performed by: NURSE PRACTITIONER

## 2022-11-17 PROCEDURE — 1159F PR MEDICATION LIST DOCUMENTED IN MEDICAL RECORD: ICD-10-PCS | Mod: CPTII,,, | Performed by: NURSE PRACTITIONER

## 2022-11-17 PROCEDURE — 3079F PR MOST RECENT DIASTOLIC BLOOD PRESSURE 80-89 MM HG: ICD-10-PCS | Mod: CPTII,,, | Performed by: NURSE PRACTITIONER

## 2022-11-17 RX ORDER — CEFDINIR 300 MG/1
300 CAPSULE ORAL 2 TIMES DAILY
Qty: 20 CAPSULE | Refills: 0 | Status: SHIPPED | OUTPATIENT
Start: 2022-11-17 | End: 2022-11-27

## 2022-11-17 NOTE — PROGRESS NOTES
"Subjective:       Patient ID: Kavon Denise is a 22 y.o. female.    Chief Complaint: Otalgia (X 1 week. ), Nasal Congestion (X 1 week), and Dysuria    Presents to clinic as above. No fever.  Review of Systems   Constitutional:  Negative for chills, fever and malaise/fatigue.   HENT:  Positive for congestion and ear pain. Negative for ear discharge and sore throat.    Respiratory: Negative.     Cardiovascular: Negative.    Gastrointestinal: Negative.    Genitourinary:  Positive for dysuria, frequency and urgency. Negative for flank pain and hematuria.        Reviewed family, medical, surgical, and social history.    Objective:      /81   Pulse 78   Temp 98.1 °F (36.7 °C)   Resp 18   Ht 5' 2" (1.575 m)   Wt 59.9 kg (132 lb)   LMP 11/02/2022   SpO2 98%   BMI 24.14 kg/m²   Physical Exam  Vitals and nursing note reviewed.   Constitutional:       General: She is not in acute distress.     Appearance: Normal appearance. She is normal weight. She is not ill-appearing, toxic-appearing or diaphoretic.   HENT:      Head: Normocephalic.      Right Ear: Tympanic membrane, ear canal and external ear normal. There is no impacted cerumen.      Left Ear: Ear canal and external ear normal. There is no impacted cerumen.      Nose: Mucosal edema, congestion and rhinorrhea present. Rhinorrhea is clear.      Right Turbinates: Enlarged and swollen.      Left Turbinates: Enlarged and swollen.      Right Sinus: No maxillary sinus tenderness or frontal sinus tenderness.      Left Sinus: No maxillary sinus tenderness or frontal sinus tenderness.      Mouth/Throat:      Mouth: Mucous membranes are moist.      Pharynx: No oropharyngeal exudate or posterior oropharyngeal erythema.   Cardiovascular:      Rate and Rhythm: Normal rate and regular rhythm.      Heart sounds: Normal heart sounds.   Pulmonary:      Effort: Pulmonary effort is normal.      Breath sounds: Normal breath sounds.   Abdominal:      General: Abdomen is flat. " Bowel sounds are normal.      Palpations: Abdomen is soft.      Comments: Mild suprapubic tenderness   Musculoskeletal:      Cervical back: Normal range of motion and neck supple.   Skin:     General: Skin is warm and dry.      Capillary Refill: Capillary refill takes less than 2 seconds.   Neurological:      Mental Status: She is alert and oriented to person, place, and time.   Psychiatric:         Mood and Affect: Mood normal.         Behavior: Behavior normal.         Thought Content: Thought content normal.         Judgment: Judgment normal.          Office Visit on 11/17/2022   Component Date Value Ref Range Status    Color, UA 11/17/2022 Yellow   Final    Spec Grav UA 11/17/2022 >=1.030   Final    pH, UA 11/17/2022 5.5   Final    WBC, UA 11/17/2022 negative   Final    Nitrite, UA 11/17/2022 negative   Final    Protein, POC 11/17/2022 trace   Final    Glucose, UA 11/17/2022 negative   Final    Ketones, UA 11/17/2022 negative   Final    Bilirubin, POC 11/17/2022 negative   Final    Urobilinogen, UA 11/17/2022 0.2   Final    Blood, UA 11/17/2022 negative   Final      Assessment:       1. Upper respiratory tract infection, unspecified type    2. Dysuria          Plan:       Upper respiratory tract infection, unspecified type  -     cefdinir (OMNICEF) 300 MG capsule; Take 1 capsule (300 mg total) by mouth 2 (two) times daily. for 10 days  Dispense: 20 capsule; Refill: 0    Dysuria  -     POCT URINALYSIS W/O SCOPE  -     cefdinir (OMNICEF) 300 MG capsule; Take 1 capsule (300 mg total) by mouth 2 (two) times daily. for 10 days  Dispense: 20 capsule; Refill: 0  Drink plenty of fluids  RTC PRN          Risks, benefits, and side effects were discussed with the patient. All questions were answered to the fullest satisfaction of the patient, and pt verbalized understanding and agreement to treatment plan. Pt was to call with any new or worsening symptoms, or present to the ER.

## 2022-12-12 ENCOUNTER — OFFICE VISIT (OUTPATIENT)
Dept: FAMILY MEDICINE | Facility: CLINIC | Age: 22
End: 2022-12-12
Payer: MEDICAID

## 2022-12-12 VITALS
WEIGHT: 131 LBS | TEMPERATURE: 98 F | DIASTOLIC BLOOD PRESSURE: 88 MMHG | OXYGEN SATURATION: 99 % | HEIGHT: 62 IN | SYSTOLIC BLOOD PRESSURE: 137 MMHG | HEART RATE: 75 BPM | RESPIRATION RATE: 20 BRPM | BODY MASS INDEX: 24.11 KG/M2

## 2022-12-12 DIAGNOSIS — R30.0 DYSURIA: Primary | ICD-10-CM

## 2022-12-12 LAB
BILIRUB SERPL-MCNC: NEGATIVE MG/DL
BLOOD URINE, POC: ABNORMAL
COLOR, POC UA: YELLOW
GLUCOSE UR QL STRIP: NEGATIVE
KETONES UR QL STRIP: ABNORMAL
LEUKOCYTE ESTERASE URINE, POC: NEGATIVE
NITRITE, POC UA: NEGATIVE
PH, POC UA: 7
PROTEIN, POC: ABNORMAL
SPECIFIC GRAVITY, POC UA: >=1.03
UROBILINOGEN, POC UA: 1

## 2022-12-12 PROCEDURE — 87077 CULTURE AEROBIC IDENTIFY: CPT | Mod: ,,, | Performed by: CLINICAL MEDICAL LABORATORY

## 2022-12-12 PROCEDURE — 3075F SYST BP GE 130 - 139MM HG: CPT | Mod: CPTII,,, | Performed by: NURSE PRACTITIONER

## 2022-12-12 PROCEDURE — 87086 CULTURE, URINE: ICD-10-PCS | Mod: ,,, | Performed by: CLINICAL MEDICAL LABORATORY

## 2022-12-12 PROCEDURE — 99213 OFFICE O/P EST LOW 20 MIN: CPT | Mod: ,,, | Performed by: NURSE PRACTITIONER

## 2022-12-12 PROCEDURE — 3008F BODY MASS INDEX DOCD: CPT | Mod: CPTII,,, | Performed by: NURSE PRACTITIONER

## 2022-12-12 PROCEDURE — 3079F DIAST BP 80-89 MM HG: CPT | Mod: CPTII,,, | Performed by: NURSE PRACTITIONER

## 2022-12-12 PROCEDURE — 87077 CULTURE, URINE: ICD-10-PCS | Mod: ,,, | Performed by: CLINICAL MEDICAL LABORATORY

## 2022-12-12 PROCEDURE — 87186 SC STD MICRODIL/AGAR DIL: CPT | Mod: ,,, | Performed by: CLINICAL MEDICAL LABORATORY

## 2022-12-12 PROCEDURE — 87186 CULTURE, URINE: ICD-10-PCS | Mod: ,,, | Performed by: CLINICAL MEDICAL LABORATORY

## 2022-12-12 PROCEDURE — 87086 URINE CULTURE/COLONY COUNT: CPT | Mod: ,,, | Performed by: CLINICAL MEDICAL LABORATORY

## 2022-12-12 PROCEDURE — 3075F PR MOST RECENT SYSTOLIC BLOOD PRESS GE 130-139MM HG: ICD-10-PCS | Mod: CPTII,,, | Performed by: NURSE PRACTITIONER

## 2022-12-12 PROCEDURE — 3079F PR MOST RECENT DIASTOLIC BLOOD PRESSURE 80-89 MM HG: ICD-10-PCS | Mod: CPTII,,, | Performed by: NURSE PRACTITIONER

## 2022-12-12 PROCEDURE — 81003 URINALYSIS AUTO W/O SCOPE: CPT | Mod: QW,RHCUB | Performed by: NURSE PRACTITIONER

## 2022-12-12 PROCEDURE — 99213 PR OFFICE/OUTPT VISIT, EST, LEVL III, 20-29 MIN: ICD-10-PCS | Mod: ,,, | Performed by: NURSE PRACTITIONER

## 2022-12-12 PROCEDURE — 3008F PR BODY MASS INDEX (BMI) DOCUMENTED: ICD-10-PCS | Mod: CPTII,,, | Performed by: NURSE PRACTITIONER

## 2022-12-12 RX ORDER — NITROFURANTOIN 25; 75 MG/1; MG/1
100 CAPSULE ORAL 2 TIMES DAILY
Qty: 14 CAPSULE | Refills: 0 | Status: ON HOLD | OUTPATIENT
Start: 2022-12-12 | End: 2023-03-23 | Stop reason: HOSPADM

## 2022-12-12 NOTE — PROGRESS NOTES
"Subjective:       Patient ID: Kavon Denise is a 22 y.o. female.    Chief Complaint: Lower abdominal discomfort (Patient states "I think I have a UTI")    Dysuria and suprapubic pain  Review of Systems   Constitutional:  Negative for chills, fatigue and fever.   Genitourinary:  Positive for dysuria. Negative for flank pain, frequency, genital sores and urgency.       Objective:      Physical Exam  Constitutional:       General: She is not in acute distress.     Appearance: Normal appearance. She is not ill-appearing, toxic-appearing or diaphoretic.   Eyes:      Pupils: Pupils are equal, round, and reactive to light.   Cardiovascular:      Rate and Rhythm: Normal rate and regular rhythm.      Pulses: Normal pulses.      Heart sounds: Normal heart sounds.   Pulmonary:      Effort: Pulmonary effort is normal.      Breath sounds: Normal breath sounds. No wheezing or rhonchi.   Abdominal:      General: Bowel sounds are normal.      Palpations: Abdomen is soft.      Tenderness: There is abdominal tenderness in the suprapubic area. There is no right CVA tenderness, left CVA tenderness, guarding or rebound.   Skin:     General: Skin is warm and dry.   Neurological:      Mental Status: She is alert and oriented to person, place, and time.   Psychiatric:         Mood and Affect: Mood normal.         Behavior: Behavior normal.          Assessment:       1. Dysuria        Plan:   Dysuria  -     POCT URINALYSIS W/O SCOPE  -     nitrofurantoin, macrocrystal-monohydrate, (MACROBID) 100 MG capsule; Take 1 capsule (100 mg total) by mouth 2 (two) times daily.  Dispense: 14 capsule; Refill: 0  -     Urine culture; Future; Expected date: 12/12/2022       Risks, benefits, and side effects were discussed with the patient. All questions were answered to the fullest satisfaction of the patient, and pt verbalized understanding and agreement to treatment plan. Pt was to call with any new or worsening symptoms, or present to the ER    "

## 2022-12-14 LAB — UA COMPLETE W REFLEX CULTURE PNL UR: ABNORMAL

## 2023-03-08 ENCOUNTER — HOSPITAL ENCOUNTER (EMERGENCY)
Facility: HOSPITAL | Age: 23
Discharge: HOME | End: 2023-03-08

## 2023-03-08 VITALS
WEIGHT: 130 LBS | BODY MASS INDEX: 23.92 KG/M2 | SYSTOLIC BLOOD PRESSURE: 124 MMHG | TEMPERATURE: 99 F | OXYGEN SATURATION: 98 % | HEIGHT: 62 IN | RESPIRATION RATE: 17 BRPM | DIASTOLIC BLOOD PRESSURE: 85 MMHG | HEART RATE: 91 BPM

## 2023-03-08 DIAGNOSIS — N75.1 BARTHOLIN'S GLAND ABSCESS: Primary | ICD-10-CM

## 2023-03-08 PROCEDURE — 56420 I&D BARTHOLINS GLAND ABSCESS: CPT

## 2023-03-08 PROCEDURE — 99283 EMERGENCY DEPT VISIT LOW MDM: CPT | Mod: 25

## 2023-03-08 PROCEDURE — 99283 PR EMERGENCY DEPT VISIT,LEVEL III: ICD-10-PCS | Mod: 25,,, | Performed by: NURSE PRACTITIONER

## 2023-03-08 PROCEDURE — 56420 I&D BARTHOLINS GLAND ABSCESS: CPT | Mod: ,,, | Performed by: NURSE PRACTITIONER

## 2023-03-08 PROCEDURE — 99283 EMERGENCY DEPT VISIT LOW MDM: CPT | Mod: 25,,, | Performed by: NURSE PRACTITIONER

## 2023-03-08 PROCEDURE — 56420 PR I&D BARTHOLIN GLAND ABSCESS: ICD-10-PCS | Mod: ,,, | Performed by: NURSE PRACTITIONER

## 2023-03-08 PROCEDURE — 25000003 PHARM REV CODE 250: Performed by: NURSE PRACTITIONER

## 2023-03-08 RX ORDER — SULFAMETHOXAZOLE AND TRIMETHOPRIM 800; 160 MG/1; MG/1
1 TABLET ORAL 2 TIMES DAILY
Qty: 14 TABLET | Refills: 0 | Status: SHIPPED | OUTPATIENT
Start: 2023-03-08 | End: 2023-03-15

## 2023-03-08 RX ORDER — LIDOCAINE HYDROCHLORIDE 10 MG/ML
10 INJECTION INFILTRATION; PERINEURAL
Status: COMPLETED | OUTPATIENT
Start: 2023-03-08 | End: 2023-03-08

## 2023-03-08 RX ADMIN — LIDOCAINE HYDROCHLORIDE 10 ML: 10 INJECTION, SOLUTION INFILTRATION; PERINEURAL at 09:03

## 2023-03-08 NOTE — DISCHARGE INSTRUCTIONS
Sitz baths or warm compresses are advised until the swelling and pain resolve.  Take antibiotics as directed.  Follow-up with your gynecologist in 2 days. Return to the emergency department for any increase in symptoms or for any other new or worrisome symptoms.

## 2023-03-08 NOTE — ED PROVIDER NOTES
"Encounter Date: 3/8/2023       History     Chief Complaint   Patient presents with    Abscess     Pt c/o left sided "knot" on her private parts. Pt states she had to have surgery in 2017 at [facility] for it.     23-year-old female presents to the emergency department to be evaluated for pain and swelling to her left vaginal area that began about 1 week ago.  Denies any fever, chills, nausea, vomiting.  She has a history of Bartholin cyst that was drained several years ago.    The history is provided by the patient.   Abscess   This is a new problem. The current episode started several days ago. Pertinent negatives include no anorexia, no decrease in physical activity, not sleeping less, not drinking less, no fever, no diarrhea, no vomiting, no congestion, no rhinorrhea, no sore throat, no decreased responsiveness and no cough.   Review of patient's allergies indicates:  No Known Allergies  History reviewed. No pertinent past medical history.  History reviewed. No pertinent surgical history.  History reviewed. No pertinent family history.  Social History     Tobacco Use    Smoking status: Every Day     Types: Cigars    Smokeless tobacco: Never   Substance Use Topics    Alcohol use: Not Currently    Drug use: Not Currently     Review of Systems   Constitutional:  Negative for chills, decreased responsiveness and fever.   HENT:  Negative for congestion, rhinorrhea and sore throat.    Respiratory:  Negative for cough.    Gastrointestinal:  Negative for anorexia, diarrhea and vomiting.   All other systems reviewed and are negative.    Physical Exam     Initial Vitals [03/08/23 0823]   BP Pulse Resp Temp SpO2   124/85 91 17 98.8 °F (37.1 °C) 98 %      MAP       --         Physical Exam    Vitals reviewed.  Constitutional: She appears well-developed and well-nourished.   Neck: Neck supple.   Cardiovascular:  Normal rate and regular rhythm.           Pulmonary/Chest: Breath sounds normal.   Abdominal: Abdomen is soft. " Bowel sounds are normal. She exhibits no distension and no mass. There is no abdominal tenderness. There is no rebound and no guarding.   Genitourinary:    Pelvic exam was performed with patient supine.         Musculoskeletal:         General: Normal range of motion.      Cervical back: Neck supple.     Neurological: She is alert and oriented to person, place, and time. She has normal strength. GCS score is 15. GCS eye subscore is 4. GCS verbal subscore is 5. GCS motor subscore is 6.   Skin: Skin is warm and dry. Capillary refill takes less than 2 seconds.   Psychiatric: She has a normal mood and affect.       Medical Screening Exam   See Full Note    ED Course   I & D - Incision and Drainage    Date/Time: 3/8/2023 9:52 AM  Location procedure was performed: [facility] EMERGENCY DEPARTMENT  Performed by: VENU Becerra  Authorized by: VENU Becerra   Pre-operative diagnosis: bartholin abscess  Post-operative diagnosis: bartholin abscess  Consent Done: Yes  Consent: Written consent obtained.  Consent given by: patient  Type: abscess  Anesthesia: local infiltration    Anesthesia:  Local Anesthetic: lidocaine 1% without epinephrine  Anesthetic total: 2 mL    Patient sedated: no  Scalpel size: 11  Incision type: single straight  Complexity: simple  Drainage: pus  Drainage amount: moderate  Wound treatment: incision and drainage  Complications: No  Estimated blood loss (mL): 1  Specimens: No  Implants: No      Labs Reviewed - No data to display       Imaging Results    None          Medications   LIDOcaine HCL 10 mg/ml (1%) injection 10 mL (10 mLs Other Given 3/8/23 0949)     Medical Decision Makin-year-old female presents to the emergency department to be evaluated for pain and swelling to her left vaginal area that began about 1 week ago.  Denies any fever, chills, nausea, vomiting.  She has a history of Bartholin cyst that was drained several years ago.  Diagnosis:  bartholin abscess  Incision  and drainage performed  RX for bactrim  Patient instructed to follow up with GYN in 2 days and return for any increase in symptoms                 Clinical Impression:   Final diagnoses:  [N75.1] Bartholin's gland abscess (Primary)        ED Disposition Condition    Discharge Stable          ED Prescriptions       Medication Sig Dispense Start Date End Date Auth. Provider    sulfamethoxazole-trimethoprim 800-160mg (BACTRIM DS) 800-160 mg Tab Take 1 tablet by mouth 2 (two) times daily. for 7 days 14 tablet 3/8/2023 3/15/2023 VENU Becerra          Follow-up Information    None          VENU Becerra  03/08/23 0926

## 2023-03-18 PROCEDURE — 87591 N.GONORRHOEAE DNA AMP PROB: CPT | Performed by: OBSTETRICS & GYNECOLOGY

## 2023-03-19 ENCOUNTER — HOSPITAL ENCOUNTER (INPATIENT)
Facility: HOSPITAL | Age: 23
LOS: 4 days | Discharge: HOME | End: 2023-03-23
Attending: EMERGENCY MEDICINE | Admitting: OBSTETRICS & GYNECOLOGY
Payer: MEDICAID

## 2023-03-19 DIAGNOSIS — N70.93 TUBO-OVARIAN ABSCESS: ICD-10-CM

## 2023-03-19 PROBLEM — A53.0 POSITIVE SEROLOGY FOR SYPHILIS: Status: ACTIVE | Noted: 2023-03-19

## 2023-03-19 LAB
ALBUMIN SERPL BCP-MCNC: 4.1 G/DL (ref 3.5–5)
ALBUMIN/GLOB SERPL: 1 {RATIO}
ALP SERPL-CCNC: 79 U/L (ref 37–98)
ALT SERPL W P-5'-P-CCNC: 15 U/L (ref 13–56)
ANION GAP SERPL CALCULATED.3IONS-SCNC: 16 MMOL/L (ref 7–16)
AST SERPL W P-5'-P-CCNC: 14 U/L (ref 15–37)
BACTERIA #/AREA URNS HPF: ABNORMAL /HPF
BASOPHILS # BLD AUTO: 0.05 K/UL (ref 0–0.2)
BASOPHILS NFR BLD AUTO: 0.2 % (ref 0–1)
BILIRUB SERPL-MCNC: 1.2 MG/DL (ref ?–1.2)
BILIRUB UR QL STRIP: NEGATIVE
BUN SERPL-MCNC: 15 MG/DL (ref 7–18)
BUN/CREAT SERPL: 17 (ref 6–20)
CALCIUM SERPL-MCNC: 9.5 MG/DL (ref 8.5–10.1)
CHLAMYDIA BY PCR: NEGATIVE
CHLORIDE SERPL-SCNC: 101 MMOL/L (ref 98–107)
CLARITY UR: CLEAR
CO2 SERPL-SCNC: 24 MMOL/L (ref 21–32)
COLOR UR: YELLOW
CREAT SERPL-MCNC: 0.87 MG/DL (ref 0.55–1.02)
DIFFERENTIAL METHOD BLD: ABNORMAL
EGFR (NO RACE VARIABLE) (RUSH/TITUS): 96 ML/MIN/1.73M²
EOSINOPHIL # BLD AUTO: 0 K/UL (ref 0–0.5)
EOSINOPHIL NFR BLD AUTO: 0 % (ref 1–4)
ERYTHROCYTE [DISTWIDTH] IN BLOOD BY AUTOMATED COUNT: 10.9 % (ref 11.5–14.5)
FLUAV AG UPPER RESP QL IA.RAPID: NEGATIVE
FLUBV AG UPPER RESP QL IA.RAPID: NEGATIVE
GLOBULIN SER-MCNC: 4 G/DL (ref 2–4)
GLUCOSE SERPL-MCNC: 89 MG/DL (ref 74–106)
GLUCOSE UR STRIP-MCNC: NORMAL MG/DL
HCT VFR BLD AUTO: 37.8 % (ref 38–47)
HGB BLD-MCNC: 13.2 G/DL (ref 12–16)
HIV 1+O+2 AB SERPL QL: NORMAL
IMM GRANULOCYTES # BLD AUTO: 0.1 K/UL (ref 0–0.04)
IMM GRANULOCYTES NFR BLD: 0.5 % (ref 0–0.4)
KETONES UR STRIP-SCNC: 60 MG/DL
LACTATE SERPL-SCNC: 0.8 MMOL/L (ref 0.4–2)
LEUKOCYTE ESTERASE UR QL STRIP: NEGATIVE
LYMPHOCYTES # BLD AUTO: 1.77 K/UL (ref 1–4.8)
LYMPHOCYTES NFR BLD AUTO: 8.1 % (ref 27–41)
MCH RBC QN AUTO: 32.4 PG (ref 27–31)
MCHC RBC AUTO-ENTMCNC: 34.9 G/DL (ref 32–36)
MCV RBC AUTO: 92.6 FL (ref 80–96)
MONOCYTES # BLD AUTO: 1.2 K/UL (ref 0–0.8)
MONOCYTES NFR BLD AUTO: 5.5 % (ref 2–6)
MPC BLD CALC-MCNC: 8.9 FL (ref 9.4–12.4)
N. GONORRHOEAE (GC) BY PCR: NEGATIVE
NEUTROPHILS # BLD AUTO: 18.73 K/UL (ref 1.8–7.7)
NEUTROPHILS NFR BLD AUTO: 85.7 % (ref 53–65)
NITRITE UR QL STRIP: NEGATIVE
NRBC # BLD AUTO: 0 X10E3/UL
NRBC, AUTO (.00): 0 %
PH UR STRIP: 6 PH UNITS
PLATELET # BLD AUTO: 295 K/UL (ref 150–400)
POTASSIUM SERPL-SCNC: 3.4 MMOL/L (ref 3.5–5.1)
PROT SERPL-MCNC: 8.1 G/DL (ref 6.4–8.2)
PROT UR QL STRIP: 50
RBC # BLD AUTO: 4.08 M/UL (ref 4.2–5.4)
RBC # UR STRIP: ABNORMAL /UL
RBC #/AREA URNS HPF: ABNORMAL /HPF
SARS-COV+SARS-COV-2 AG RESP QL IA.RAPID: NEGATIVE
SODIUM SERPL-SCNC: 138 MMOL/L (ref 136–145)
SP GR UR STRIP: >1.03
SQUAMOUS #/AREA URNS LPF: ABNORMAL /LPF
SYPHILIS AB INTERPRETATION: REACTIVE
UROBILINOGEN UR STRIP-ACNC: NORMAL MG/DL
WBC # BLD AUTO: 21.85 K/UL (ref 4.5–11)
WBC #/AREA URNS HPF: ABNORMAL /HPF

## 2023-03-19 PROCEDURE — 86780 TREPONEMA PALLIDUM: CPT | Performed by: OBSTETRICS & GYNECOLOGY

## 2023-03-19 PROCEDURE — 81001 URINALYSIS AUTO W/SCOPE: CPT | Performed by: EMERGENCY MEDICINE

## 2023-03-19 PROCEDURE — 96375 TX/PRO/DX INJ NEW DRUG ADDON: CPT

## 2023-03-19 PROCEDURE — 87389 HIV-1 AG W/HIV-1&-2 AB AG IA: CPT | Performed by: OBSTETRICS & GYNECOLOGY

## 2023-03-19 PROCEDURE — 96374 THER/PROPH/DIAG INJ IV PUSH: CPT

## 2023-03-19 PROCEDURE — 25500020 PHARM REV CODE 255: Performed by: EMERGENCY MEDICINE

## 2023-03-19 PROCEDURE — S0030 INJECTION, METRONIDAZOLE: HCPCS | Performed by: EMERGENCY MEDICINE

## 2023-03-19 PROCEDURE — 63600175 PHARM REV CODE 636 W HCPCS: Performed by: EMERGENCY MEDICINE

## 2023-03-19 PROCEDURE — 80053 COMPREHEN METABOLIC PANEL: CPT | Performed by: EMERGENCY MEDICINE

## 2023-03-19 PROCEDURE — 25000003 PHARM REV CODE 250: Performed by: OBSTETRICS & GYNECOLOGY

## 2023-03-19 PROCEDURE — 99285 EMERGENCY DEPT VISIT HI MDM: CPT | Mod: ,,, | Performed by: EMERGENCY MEDICINE

## 2023-03-19 PROCEDURE — 85025 COMPLETE CBC W/AUTO DIFF WBC: CPT | Performed by: EMERGENCY MEDICINE

## 2023-03-19 PROCEDURE — 96361 HYDRATE IV INFUSION ADD-ON: CPT

## 2023-03-19 PROCEDURE — S0030 INJECTION, METRONIDAZOLE: HCPCS | Performed by: OBSTETRICS & GYNECOLOGY

## 2023-03-19 PROCEDURE — 99285 EMERGENCY DEPT VISIT HI MDM: CPT | Mod: 25

## 2023-03-19 PROCEDURE — 63600175 PHARM REV CODE 636 W HCPCS: Performed by: OBSTETRICS & GYNECOLOGY

## 2023-03-19 PROCEDURE — 87040 BLOOD CULTURE FOR BACTERIA: CPT | Performed by: EMERGENCY MEDICINE

## 2023-03-19 PROCEDURE — 11000001 HC ACUTE MED/SURG PRIVATE ROOM

## 2023-03-19 PROCEDURE — 99285 PR EMERGENCY DEPT VISIT,LEVEL V: ICD-10-PCS | Mod: ,,, | Performed by: EMERGENCY MEDICINE

## 2023-03-19 PROCEDURE — 86592 SYPHILIS TEST NON-TREP QUAL: CPT | Performed by: OBSTETRICS & GYNECOLOGY

## 2023-03-19 PROCEDURE — 87591 N.GONORRHOEAE DNA AMP PROB: CPT | Performed by: EMERGENCY MEDICINE

## 2023-03-19 PROCEDURE — 87428 SARSCOV & INF VIR A&B AG IA: CPT | Performed by: EMERGENCY MEDICINE

## 2023-03-19 PROCEDURE — 25000003 PHARM REV CODE 250: Performed by: EMERGENCY MEDICINE

## 2023-03-19 PROCEDURE — 83605 ASSAY OF LACTIC ACID: CPT | Performed by: EMERGENCY MEDICINE

## 2023-03-19 RX ORDER — SODIUM CHLORIDE 0.9 % (FLUSH) 0.9 %
10 SYRINGE (ML) INJECTION EVERY 12 HOURS PRN
Status: DISCONTINUED | OUTPATIENT
Start: 2023-03-19 | End: 2023-03-23 | Stop reason: HOSPADM

## 2023-03-19 RX ORDER — HYDROMORPHONE HYDROCHLORIDE 2 MG/ML
0.5 INJECTION, SOLUTION INTRAMUSCULAR; INTRAVENOUS; SUBCUTANEOUS
Status: COMPLETED | OUTPATIENT
Start: 2023-03-19 | End: 2023-03-19

## 2023-03-19 RX ORDER — DIPHENHYDRAMINE HCL 25 MG
25 CAPSULE ORAL EVERY 6 HOURS PRN
Status: DISCONTINUED | OUTPATIENT
Start: 2023-03-20 | End: 2023-03-19

## 2023-03-19 RX ORDER — MORPHINE SULFATE ORAL SOLUTION 10 MG/5ML
5 SOLUTION ORAL EVERY 6 HOURS PRN
Status: DISCONTINUED | OUTPATIENT
Start: 2023-03-19 | End: 2023-03-23 | Stop reason: HOSPADM

## 2023-03-19 RX ORDER — ACETAMINOPHEN 325 MG/1
650 TABLET ORAL EVERY 8 HOURS PRN
Status: DISCONTINUED | OUTPATIENT
Start: 2023-03-19 | End: 2023-03-23 | Stop reason: HOSPADM

## 2023-03-19 RX ORDER — ONDANSETRON 2 MG/ML
4 INJECTION INTRAMUSCULAR; INTRAVENOUS EVERY 6 HOURS PRN
Status: DISCONTINUED | OUTPATIENT
Start: 2023-03-19 | End: 2023-03-23 | Stop reason: HOSPADM

## 2023-03-19 RX ORDER — ONDANSETRON 2 MG/ML
4 INJECTION INTRAMUSCULAR; INTRAVENOUS
Status: COMPLETED | OUTPATIENT
Start: 2023-03-19 | End: 2023-03-19

## 2023-03-19 RX ORDER — METRONIDAZOLE 500 MG/100ML
500 INJECTION, SOLUTION INTRAVENOUS
Status: DISCONTINUED | OUTPATIENT
Start: 2023-03-19 | End: 2023-03-23 | Stop reason: HOSPADM

## 2023-03-19 RX ORDER — SODIUM CHLORIDE 9 MG/ML
INJECTION, SOLUTION INTRAVENOUS CONTINUOUS
Status: DISCONTINUED | OUTPATIENT
Start: 2023-03-19 | End: 2023-03-23 | Stop reason: HOSPADM

## 2023-03-19 RX ORDER — IBUPROFEN 600 MG/1
600 TABLET ORAL EVERY 6 HOURS PRN
Status: DISCONTINUED | OUTPATIENT
Start: 2023-03-19 | End: 2023-03-23 | Stop reason: HOSPADM

## 2023-03-19 RX ORDER — PROCHLORPERAZINE EDISYLATE 5 MG/ML
5 INJECTION INTRAMUSCULAR; INTRAVENOUS EVERY 6 HOURS PRN
Status: DISCONTINUED | OUTPATIENT
Start: 2023-03-19 | End: 2023-03-23 | Stop reason: HOSPADM

## 2023-03-19 RX ORDER — HYDROMORPHONE HYDROCHLORIDE 2 MG/ML
1 INJECTION, SOLUTION INTRAMUSCULAR; INTRAVENOUS; SUBCUTANEOUS EVERY 4 HOURS PRN
Status: DISCONTINUED | OUTPATIENT
Start: 2023-03-19 | End: 2023-03-23 | Stop reason: HOSPADM

## 2023-03-19 RX ORDER — METRONIDAZOLE 500 MG/100ML
500 INJECTION, SOLUTION INTRAVENOUS
Status: COMPLETED | OUTPATIENT
Start: 2023-03-19 | End: 2023-03-19

## 2023-03-19 RX ORDER — MORPHINE SULFATE 4 MG/ML
4 INJECTION, SOLUTION INTRAMUSCULAR; INTRAVENOUS
Status: COMPLETED | OUTPATIENT
Start: 2023-03-19 | End: 2023-03-19

## 2023-03-19 RX ORDER — DIPHENHYDRAMINE HCL 25 MG
25 CAPSULE ORAL EVERY 4 HOURS
Status: DISCONTINUED | OUTPATIENT
Start: 2023-03-20 | End: 2023-03-23 | Stop reason: HOSPADM

## 2023-03-19 RX ADMIN — SODIUM CHLORIDE 1000 ML: 9 INJECTION, SOLUTION INTRAVENOUS at 12:03

## 2023-03-19 RX ADMIN — MORPHINE SULFATE 4 MG: 4 INJECTION, SOLUTION INTRAMUSCULAR; INTRAVENOUS at 11:03

## 2023-03-19 RX ADMIN — HYDROMORPHONE HYDROCHLORIDE 0.5 MG: 2 INJECTION, SOLUTION INTRAMUSCULAR; INTRAVENOUS; SUBCUTANEOUS at 12:03

## 2023-03-19 RX ADMIN — DIPHENHYDRAMINE HYDROCHLORIDE 25 MG: 25 CAPSULE ORAL at 11:03

## 2023-03-19 RX ADMIN — SODIUM CHLORIDE: 9 INJECTION, SOLUTION INTRAVENOUS at 03:03

## 2023-03-19 RX ADMIN — IOPAMIDOL 100 ML: 755 INJECTION, SOLUTION INTRAVENOUS at 11:03

## 2023-03-19 RX ADMIN — DEXTROSE MONOHYDRATE 100 MG: 50 INJECTION, SOLUTION INTRAVENOUS at 02:03

## 2023-03-19 RX ADMIN — METRONIDAZOLE 500 MG: 500 INJECTION, SOLUTION INTRAVENOUS at 01:03

## 2023-03-19 RX ADMIN — DEXTROSE MONOHYDRATE 1 G: 5 INJECTION INTRAVENOUS at 12:03

## 2023-03-19 RX ADMIN — METRONIDAZOLE 500 MG: 500 INJECTION, SOLUTION INTRAVENOUS at 09:03

## 2023-03-19 RX ADMIN — HYDROMORPHONE HYDROCHLORIDE 1 MG: 2 INJECTION, SOLUTION INTRAMUSCULAR; INTRAVENOUS; SUBCUTANEOUS at 09:03

## 2023-03-19 RX ADMIN — ONDANSETRON 4 MG: 2 INJECTION INTRAMUSCULAR; INTRAVENOUS at 11:03

## 2023-03-19 RX ADMIN — SODIUM CHLORIDE: 9 INJECTION, SOLUTION INTRAVENOUS at 09:03

## 2023-03-19 RX ADMIN — IBUPROFEN 600 MG: 600 TABLET, FILM COATED ORAL at 09:03

## 2023-03-19 NOTE — H&P
[facility] - Emergency Department  Obstetrics & Gynecology  History & Physical    Patient Name: Kavon Denise  MRN: [id number]  Admission Date: 3/19/2023  Primary Care Provider: Primary Doctor No    Subjective:     Chief Complaint/Reason for Admission: Abdominal pain    History of Present Illness:    3/19/23  Unassigned patient - Has seen Erica Luke CNM and Keya Ruff CNM in clinic in 2021.    Patient is a very pleasant 22yo G0, LMP 3/5-3/9, not on contraception that presents to the ER within 24hr of previous with new-onset abdominal pain    Patient presented to the ER at [facility] on 3/18 with N/V and body aches. After evaluation it was felt she had a UTI and was given a dose of Rocephin and Dcd with a Bactrim Rx. Her abdomen was nontender.    The patient returned today with complaint of abdominal tenderness. Her CBC noted an increased WBC to 21.8 from 18.0.  A CT scan was obtained that was notable for likely TOA - bilateral.    CT ABDOMEN PELVIS WITH CONTRAST     CLINICAL HISTORY:  Abdominal abscess/infection suspected;     COMPARISON:  CT abdomen pelvis March 29, 2019     TECHNIQUE:  Multiple axial tomographic images of the abdomen and pelvis were obtained after administration of 100 cc Isovue 370 intravenous contrast.     FINDINGS:  Mild dependent changes of the lungs noted.     Subcentimeter hypodensity demonstrated within the right lobe of the liver which is too small to characterize but may reflect a cyst.  Visualized gallbladder grossly unremarkable.  Visualized pancreas appears unremarkable.  Spleen grossly unremarkable.     Bilateral adrenal glands grossly unremarkable.  Bilateral kidneys appear grossly unremarkable.  Urinary bladder incompletely distended.  There is a complex septated cystic mass within the dorsal left pelvis measuring up to 6.8 x 5.0 cm in axial dimension with differential including tubo-ovarian abscess.  There is a complex septated cystic mass within the ventral right  pelvis which measures up to 6.0 by 4.3 cm in axial dimension with differential including tubo-ovarian abscess.     No evidence of gastrointestinal obstruction.  The appendix is not identified with certainty.  Vasculature grossly unremarkable.  Visualized osseous and surrounding soft tissue structures demonstrate no acute abnormality.     Impression:     There is a complex septated cystic mass within the dorsal left pelvis measuring up to 6.8 x 5.0 cm in axial dimension with differential including tubo-ovarian abscess.  There is a complex septated cystic mass within the ventral right pelvis which measures up to 6.0 by 4.3 cm in axial dimension with differential including tubo-ovarian abscess.     The CT exam was performed using one or more of the following dose     reduction techniques- Automated exposure control, adjustment of the mA     and/or kV according to patient size, and/or use of iterative     reconstructed technique.     Point of Service: [facility]        Electronically signed by: Dread Warren  Date:                                            03/19/2023  Time:                                           11:30      The patient states that her pain is lower abdominal, mostly midline and does not radiate. Aggravating factors are movement, palpation. No alleviating factors (did respond with some improvement after 0.5mg Dilaudid in ER by ER team).    Deneis fever, chills.  Denies vaginal bleeding.  Denies abnormal vaginal discharge or odor.    Patient does have unprotected intercourse (would like to have a pregnancy and has stated as much in the past.)  Patient is noted through her clinic notes to have Hx of high risk sexual activity.  Her labs are positive for History of Syphilis, HSV 2 (though she denies any vulvovaginal or oral outbreaks).    She denies anal intercourse.    A urine specimen from today was sent for GC/CT PCR but patient received Rocephin yesterday and today.  I called the lab and  they have a pretreatment specimen from 3/18 and will run off of that specimen as well since the first is in process.          OB History   No obstetric history on file.     No past medical history on file.  No past surgical history on file.    (Not in a hospital admission)      Review of patient's allergies indicates:  No Known Allergies     Family History    None       Tobacco Use    Smoking status: Every Day     Types: Cigars    Smokeless tobacco: Never   Substance and Sexual Activity    Alcohol use: Not Currently    Drug use: Not Currently    Sexual activity: Yes     Review of Systems   Constitutional:  Negative for chills and fever.   HENT:  Negative for mouth sores.    Eyes:  Negative for discharge and visual disturbance.   Respiratory:  Negative for cough and shortness of breath.    Cardiovascular:  Negative for chest pain and leg swelling.   Gastrointestinal:  Positive for abdominal pain. Negative for blood in stool, constipation, diarrhea, nausea and vomiting.   Genitourinary:  Positive for pelvic pain. Negative for dysuria, flank pain, frequency, genital sores, hematuria, menorrhagia, menstrual problem, urgency, vaginal bleeding, vaginal discharge, vaginal pain and vaginal odor.   Musculoskeletal:  Negative for back pain.   Neurological:  Negative for seizures, syncope and headaches.   Psychiatric/Behavioral:  Negative for depression. The patient is not nervous/anxious.     Objective:     Vital Signs (Most Recent):  Temp: 99.4 °F (37.4 °C) (03/19/23 1037)  Pulse: 91 (03/19/23 1235)  Resp: 16 (03/19/23 1235)  BP: (!) 130/92 (03/19/23 1037)  SpO2: 97 % (03/19/23 1037)   Vital Signs (24h Range):  Temp:  [99.4 °F (37.4 °C)] 99.4 °F (37.4 °C)  Pulse:  [] 91  Resp:  [16] 16  SpO2:  [97 %] 97 %  BP: (130)/(92) 130/92     Weight: 58.1 kg (128 lb)  Body mass index is 23.41 kg/m².    Patient's last menstrual period was 03/09/2023 (approximate).    Physical Exam:   Constitutional: She is oriented to person,  place, and time. She appears well-developed and well-nourished.    HENT:   Head: Normocephalic and atraumatic.    Eyes: Pupils are equal, round, and reactive to light.     Cardiovascular:  Normal rate, regular rhythm and normal heart sounds.      Exam reveals no edema.        Pulmonary/Chest: Effort normal and breath sounds normal. No respiratory distress.        Abdominal: Soft. Bowel sounds are normal. She exhibits no distension and no mass. There is abdominal tenderness. There is no rebound and no guarding.     Genitourinary:    Pelvic exam was performed with patient supine.   The external female genitalia was normal.   No external genitalia lesions identified,Genitalia hair distrobution normal .             Musculoskeletal: Normal range of motion.       Neurological: She is alert and oriented to person, place, and time. She displays normal reflexes. She exhibits normal muscle tone.    Skin: Skin is warm and dry. No rash noted. No erythema.    Psychiatric: She has a normal mood and affect. Her behavior is normal.     Laboratory:    Recent Results (from the past 24 hour(s))   Comprehensive metabolic panel    Collection Time: 03/19/23 10:55 AM   Result Value Ref Range    Sodium 138 136 - 145 mmol/L    Potassium 3.4 (L) 3.5 - 5.1 mmol/L    Chloride 101 98 - 107 mmol/L    CO2 24 21 - 32 mmol/L    Anion Gap 16 7 - 16 mmol/L    Glucose 89 74 - 106 mg/dL    BUN 15 7 - 18 mg/dL    Creatinine 0.87 0.55 - 1.02 mg/dL    BUN/Creatinine Ratio 17 6 - 20    Calcium 9.5 8.5 - 10.1 mg/dL    Total Protein 8.1 6.4 - 8.2 g/dL    Albumin 4.1 3.5 - 5.0 g/dL    Globulin 4.0 2.0 - 4.0 g/dL    A/G Ratio 1.0     Bilirubin, Total 1.2 >0.0 - 1.2 mg/dL    Alk Phos 79 37 - 98 U/L    ALT 15 13 - 56 U/L    AST 14 (L) 15 - 37 U/L    eGFR 96 >=60 mL/min/1.73m²   CBC with Differential    Collection Time: 03/19/23 10:55 AM   Result Value Ref Range    WBC 21.85 (H) 4.50 - 11.00 K/uL    RBC 4.08 (L) 4.20 - 5.40 M/uL    Hemoglobin 13.2 12.0 - 16.0  g/dL    Hematocrit 37.8 (L) 38.0 - 47.0 %    MCV 92.6 80.0 - 96.0 fL    MCH 32.4 (H) 27.0 - 31.0 pg    MCHC 34.9 32.0 - 36.0 g/dL    RDW 10.9 (L) 11.5 - 14.5 %    Platelet Count 295 150 - 400 K/uL    MPV 8.9 (L) 9.4 - 12.4 fL    Neutrophils % 85.7 (H) 53.0 - 65.0 %    Lymphocytes % 8.1 (L) 27.0 - 41.0 %    Monocytes % 5.5 2.0 - 6.0 %    Eosinophils % 0.0 (L) 1.0 - 4.0 %    Basophils % 0.2 0.0 - 1.0 %    Immature Granulocytes % 0.5 (H) 0.0 - 0.4 %    nRBC, Auto 0.0 <=0.0 %    Neutrophils, Abs 18.73 (H) 1.80 - 7.70 K/uL    Lymphocytes, Absolute 1.77 1.00 - 4.80 K/uL    Monocytes, Absolute 1.20 (H) 0.00 - 0.80 K/uL    Eosinophils, Absolute 0.00 0.00 - 0.50 K/uL    Basophils, Absolute 0.05 0.00 - 0.20 K/uL    Immature Granulocytes, Absolute 0.10 (H) 0.00 - 0.04 K/uL    nRBC, Absolute 0.00 <=0.00 x10e3/uL    Diff Type Auto    Urinalysis, Reflex to Urine Culture    Collection Time: 03/19/23 11:36 AM    Specimen: Urine, Clean Catch   Result Value Ref Range    Color, UA Yellow Colorless, Straw, Yellow, Light Yellow, Dark Yellow    Clarity, UA Clear Clear    pH, UA 6.0 5.0 to 8.0 pH Units    Leukocytes, UA Negative Negative    Nitrites, UA Negative Negative    Protein, UA 50 (A) Negative    Glucose, UA Normal Normal mg/dL    Ketones, UA 60 (A) Negative mg/dL    Urobilinogen, UA Normal 0.2, 1.0, Normal mg/dL    Bilirubin, UA Negative Negative    Blood, UA Small (A) Negative    Specific Gravity, UA >1.030 (H) <=1.030   SARS-CoV2 (COVID) with FLU Antigen    Collection Time: 03/19/23 11:36 AM   Result Value Ref Range    Influenza A Negative Negative, Invalid    Influenza B Negative Negative, Invalid    COVID-19 Ag Negative Negative, Invalid   Urinalysis, Microscopic    Collection Time: 03/19/23 11:36 AM   Result Value Ref Range    WBC, UA 0-5 None Seen, 0-5 /hpf    RBC, UA None Seen None Seen, 0-3 /hpf    Bacteria, UA Few (A) None Seen /hpf    Squamous Epithelial Cells, UA Few (A) None Seen, Rare, None Seen To Occasional /lpf    Lactic acid, plasma    Collection Time: 03/19/23 12:17 PM   Result Value Ref Range    Lactic Acid 0.8 0.4 - 2.0 mmol/L   Syphilis Antibody with reflex to RPR    Collection Time: 03/19/23  2:00 PM   Result Value Ref Range    Syphilis Ab Interpretation Reactive (A) Non-Reactive   HIV 1/2 Ag/Ab (4th Gen)    Collection Time: 03/19/23  2:00 PM   Result Value Ref Range    HIV 1/2 Non-Reactive Non-Reactive        Recent Lab Results         03/19/23  1400   03/19/23  1217   03/19/23  1136   03/19/23  1055        Influenza B, Molecular     Negative         Albumin/Globulin Ratio       1.0       Albumin       4.1       Alkaline Phosphatase       79       ALT       15       Anion Gap       16       Appearance, UA     Clear         AST       14       Bacteria, UA     Few         Baso #       0.05       Basophil %       0.2       Bilirubin (UA)     Negative         BILIRUBIN TOTAL       1.2       BUN       15       BUN/CREAT RATIO       17       Calcium       9.5       Chloride       101       CO2       24       Color, UA     Yellow         COVID-19 Ag     Negative         Creatinine       0.87       Differential Type       Auto       eGFR       96       Eos #       0.00       Eosinophil %       0.0       Globulin, Total       4.0       Glucose       89       Glucose, UA     Normal         Hematocrit       37.8       Hemoglobin       13.2       HIV 1/2 Ag/Ab Non-Reactive             Immature Grans (Abs)       0.10       Immature Granulocytes       0.5       Influenza A     Negative         Ketones, UA     60         Lactate, Tarun   0.8           Leukocytes, UA     Negative         Lymph #       1.77       Lymph %       8.1       MCH       32.4       MCHC       34.9       MCV       92.6       Mono #       1.20       Mono %       5.5       MPV       8.9       Neutrophils, Abs       18.73       Neutrophils Relative       85.7       NITRITE UA     Negative         nRBC       0.0       NUCLEATED RBC ABSOLUTE       0.00       Occult  Blood UA     Small         pH, UA     6.0         Platelets       295       Potassium       3.4       PROTEIN TOTAL       8.1       Protein, UA     50         RBC       4.08       RBC, UA     None Seen         RDW       10.9       Sodium       138       Specific Gravity, UA     >1.030         Squam Epithel, UA     Few         Syphilis Ab Interpretation Reactive  Comment: 0.0 - 0.9: Non-Reactive  0.91 - 1.10: Equivocal with RPR to follow  >1.10:  Reactive with RPR to Follow             UROBILINOGEN UA     Normal         WBC, UA     0-5         WBC       21.85             I have personallly reviewed all pertinent lab results from the last 24 hours.    Diagnostic Results:  CT: Reviewed  Suggestive of b/l TOA    Assessment/Plan:     Renal/  * Tubo-ovarian abscess    Admit to inpatient    IV antibiotics  Rocephin  Flagyl  Doxycycline    IV Fluids (NS since LR is not compatible with Rocephin)    Pain management  Ibuprofen  Tylenol  Dilaudid    Anti-emetics    A urine specimen from today was sent for GC/CT PCR but patient received Rocephin yesterday and today.  I called the lab and they have a pretreatment specimen from 3/18 and will run off of that specimen as well since the first is in process.          ID  Positive serology for syphilis    Confirmatory testing being done    Patient has Hx of Syphilis        Esau Bradley MD  Obstetrics & Gynecology  [facility] - Emergency Department

## 2023-03-19 NOTE — ED PROVIDER NOTES
Encounter Date: 3/19/2023    SCRIBE #1 NOTE: I, Michele Toth MD, am scribing for, and in the presence of,  Jumana Bruce. I have scribed the entire note.     History     Chief Complaint   Patient presents with    Abdominal Pain     The patient is a 23 y.o. female who presents to the emergency department for abdominal pain. The patient localizes her pain to the lower part of her abdomen. The patient came to the ED yesterday with the same chief complaint. She was given medicine that has eased her vomiting. She is now unable to eat anything. She is still, however, nauseous. She denies diarrhea or possibly being pregnant. There are no other complaints in the ED at this time.     The history is provided by the patient. No  was used.   Review of patient's allergies indicates:  No Known Allergies  No past medical history on file.  No past surgical history on file.  No family history on file.  Social History     Tobacco Use    Smoking status: Every Day     Types: Cigars    Smokeless tobacco: Never   Substance Use Topics    Alcohol use: Not Currently    Drug use: Not Currently     Review of Systems   Constitutional:  Positive for appetite change.   Eyes: Negative.    Gastrointestinal:  Positive for abdominal pain and nausea. Negative for diarrhea and vomiting.   Endocrine: Negative.    Allergic/Immunologic: Negative.    All other systems reviewed and are negative.    Physical Exam     Initial Vitals [03/19/23 1037]   BP Pulse Resp Temp SpO2   (!) 130/92 110 16 99.4 °F (37.4 °C) 97 %      MAP       --         Physical Exam    Nursing note and vitals reviewed.  Constitutional: She appears well-developed and well-nourished.   HENT:   Head: Normocephalic and atraumatic.   Eyes: Conjunctivae and EOM are normal. Pupils are equal, round, and reactive to light.   Neck:   Normal range of motion.  Cardiovascular:  Normal rate, regular rhythm and normal heart sounds.           Pulmonary/Chest: Breath sounds  normal.   Abdominal: Abdomen is soft. Bowel sounds are normal. abdominal tenderness in the suprapubic area   Musculoskeletal:         General: Normal range of motion.      Cervical back: Normal range of motion.     Neurological: She is alert and oriented to person, place, and time.   Skin: Skin is warm and dry.   Psychiatric: She has a normal mood and affect. Her behavior is normal. Judgment and thought content normal.       ED Course   Procedures  Labs Reviewed   COMPREHENSIVE METABOLIC PANEL - Abnormal; Notable for the following components:       Result Value    Potassium 3.4 (*)     AST 14 (*)     All other components within normal limits   URINALYSIS, REFLEX TO URINE CULTURE - Abnormal; Notable for the following components:    Protein, UA 50 (*)     Ketones, UA 60 (*)     Blood, UA Small (*)     Specific Gravity, UA >1.030 (*)     All other components within normal limits   CBC WITH DIFFERENTIAL - Abnormal; Notable for the following components:    WBC 21.85 (*)     RBC 4.08 (*)     Hematocrit 37.8 (*)     MCH 32.4 (*)     RDW 10.9 (*)     MPV 8.9 (*)     Neutrophils % 85.7 (*)     Lymphocytes % 8.1 (*)     Eosinophils % 0.0 (*)     Immature Granulocytes % 0.5 (*)     Neutrophils, Abs 18.73 (*)     Monocytes, Absolute 1.20 (*)     Immature Granulocytes, Absolute 0.10 (*)     All other components within normal limits   URINALYSIS, MICROSCOPIC - Abnormal; Notable for the following components:    Bacteria, UA Few (*)     Squamous Epithelial Cells, UA Few (*)     All other components within normal limits   SARS-COV2 (COVID) W/ FLU ANTIGEN - Normal    Narrative:     Negative SARS-CoV results should not be used as the sole basis for treatment or patient management decisions; negative results should be considered in the context of a patient's recent exposures, history and the presene of clinical signs and symptoms consistent with COVID-19.  Negative results should be treated as presumptive and confirmed by molecular  assay, if necessary for patient management.   CULTURE, BLOOD   CULTURE, BLOOD   CHLAMYDIA/GONORRHOEAE(GC), PCR   CBC W/ AUTO DIFFERENTIAL    Narrative:     The following orders were created for panel order CBC auto differential.  Procedure                               Abnormality         Status                     ---------                               -----------         ------                     CBC with Differential[[id number]]        Abnormal            Final result                 Please view results for these tests on the individual orders.   EXTRA TUBES    Narrative:     The following orders were created for panel order EXTRA TUBES.  Procedure                               Abnormality         Status                     ---------                               -----------         ------                     Light Blue Top Hold[[id number]]                              In process                 Light Green Top Hold[[id number]]                             In process                   Please view results for these tests on the individual orders.   LIGHT BLUE TOP HOLD   LIGHT GREEN TOP HOLD   LACTIC ACID, PLASMA          Imaging Results              CT Abdomen Pelvis With Contrast (Final result)  Result time 03/19/23 11:30:18      Final result by Dread Warren DO (03/19/23 11:30:18)                   Impression:      There is a complex septated cystic mass within the dorsal left pelvis measuring up to 6.8 x 5.0 cm in axial dimension with differential including tubo-ovarian abscess.  There is a complex septated cystic mass within the ventral right pelvis which measures up to 6.0 by 4.3 cm in axial dimension with differential including tubo-ovarian abscess.    The CT exam was performed using one or more of the following dose    reduction techniques- Automated exposure control, adjustment of the mA    and/or kV according to patient size, and/or use of iterative    reconstructed technique.    Point of Service: [facility]      Electronically signed by: Dread Warren  Date:    03/19/2023  Time:    11:30               Narrative:    EXAMINATION:  CT ABDOMEN PELVIS WITH CONTRAST    CLINICAL HISTORY:  Abdominal abscess/infection suspected;    COMPARISON:  CT abdomen pelvis March 29, 2019    TECHNIQUE:  Multiple axial tomographic images of the abdomen and pelvis were obtained after administration of 100 cc Isovue 370 intravenous contrast.    FINDINGS:  Mild dependent changes of the lungs noted.    Subcentimeter hypodensity demonstrated within the right lobe of the liver which is too small to characterize but may reflect a cyst.  Visualized gallbladder grossly unremarkable.  Visualized pancreas appears unremarkable.  Spleen grossly unremarkable.    Bilateral adrenal glands grossly unremarkable.  Bilateral kidneys appear grossly unremarkable.  Urinary bladder incompletely distended.  There is a complex septated cystic mass within the dorsal left pelvis measuring up to 6.8 x 5.0 cm in axial dimension with differential including tubo-ovarian abscess.  There is a complex septated cystic mass within the ventral right pelvis which measures up to 6.0 by 4.3 cm in axial dimension with differential including tubo-ovarian abscess.    No evidence of gastrointestinal obstruction.  The appendix is not identified with certainty.  Vasculature grossly unremarkable.  Visualized osseous and surrounding soft tissue structures demonstrate no acute abnormality.                                       Medications   cefTRIAXone (ROCEPHIN) 1 g in dextrose 5 % in water (D5W) 5 % 50 mL IVPB (MB+) (has no administration in time range)   metronidazole IVPB 500 mg (has no administration in time range)   doxycycline (VIBRAMYCIN) 100 mg in dextrose 5 % (D5W) 250 mL IVPB (has no administration in time range)   sodium chloride 0.9% bolus 1,000 mL 1,000 mL (has no administration in time range)   sodium chloride 0.9% flush 10 mL (has no administration in  time range)   doxycycline (VIBRAMYCIN) 100 mg in dextrose 5 % (D5W) 250 mL IVPB (has no administration in time range)   metronidazole IVPB 500 mg (has no administration in time range)   cefTRIAXone (ROCEPHIN) 1 g in dextrose 5 % in water (D5W) 5 % 50 mL IVPB (MB+) (has no administration in time range)   HYDROmorphone (PF) injection 1 mg (has no administration in time range)   morphine 10 mg/5 mL solution 5 mg (has no administration in time range)   ibuprofen tablet 600 mg (has no administration in time range)   ondansetron injection 4 mg (has no administration in time range)   prochlorperazine injection Soln 5 mg (has no administration in time range)   acetaminophen tablet 650 mg (has no administration in time range)   morphine injection 4 mg (4 mg Intravenous Given 3/19/23 1124)   ondansetron injection 4 mg (4 mg Intravenous Given 3/19/23 1100)   iopamidoL (ISOVUE-370) injection 100 mL (100 mLs Intravenous Given 3/19/23 1118)   HYDROmorphone (PF) injection 0.5 mg (0.5 mg Intravenous Given 3/19/23 1224)     Medical Decision Making:   ED Management:  [facility]    Patient presents for emergent evaluation of acute lower abdominal pain that poses a threat to life and/or bodily function.    In the ED patient found to have acute to ovarian abscess.    I ordered labs and personally reviewed them.  Labs significant for leukocytosis 60 ketones in the urine creatinine normal.    I ordered CT scan and personally reviewed it and reviewed the radiologist interpretation.  CT significant for Complex septated cystic masses and bilateral pelvis area concerning for tubo-ovarian abscess.      Admission [facility]  I discussed the patient presentation labs, ekg, X-rays, CT findings with the consultant for gynecology (speciality).    Patient was managed in the ED with IV morphine Zofran normal saline Rocephin doxycycline Flagyl.    The response to treatment was stable.    Patient required emergent consultation to gynecology (admitting physician) for  admission.            Attending Attestation:           Physician Attestation for Scribe:  Physician Attestation Statement for Scribe #1: I, Michele Toth MD, reviewed documentation, as scribed by Jumana Bruce in my presence, and it is both accurate and complete.           ED Course as of 03/19/23 1226   Sun Mar 19, 2023   1145 Temp: 99.4 °F (37.4 °C) [PK]   1154 CT showed tubo-ovarian abscesses.  Discussed with gynecologist will be admitted to the hospital.  Recommended Rocephin doxycycline and Flagyl IV [PK]      ED Course User Index  [PK] Michele Toth MD                 Clinical Impression:   Final diagnoses:  [N70.93] Tubo-ovarian abscess        ED Disposition Condition    Admit Stable                Michele Toth MD  03/19/23 1226

## 2023-03-19 NOTE — HPI
3/19/23  Unassigned patient - Has seen Erica Luke CNM and Keya Ruff CNM in clinic in 2021.    Patient is a very pleasant 22yo G0, LMP 3/5-3/9, not on contraception that presents to the ER within 24hr of previous with new-onset abdominal pain    Patient presented to the ER at [facility] on 3/18 with N/V and body aches. After evaluation it was felt she had a UTI and was given a dose of Rocephin and Dcd with a Bactrim Rx. Her abdomen was nontender.    The patient returned today with complaint of abdominal tenderness. Her CBC noted an increased WBC to 21.8 from 18.0.  A CT scan was obtained that was notable for likely TOA - bilateral.    CT ABDOMEN PELVIS WITH CONTRAST     CLINICAL HISTORY:  Abdominal abscess/infection suspected;     COMPARISON:  CT abdomen pelvis March 29, 2019     TECHNIQUE:  Multiple axial tomographic images of the abdomen and pelvis were obtained after administration of 100 cc Isovue 370 intravenous contrast.     FINDINGS:  Mild dependent changes of the lungs noted.     Subcentimeter hypodensity demonstrated within the right lobe of the liver which is too small to characterize but may reflect a cyst.  Visualized gallbladder grossly unremarkable.  Visualized pancreas appears unremarkable.  Spleen grossly unremarkable.     Bilateral adrenal glands grossly unremarkable.  Bilateral kidneys appear grossly unremarkable.  Urinary bladder incompletely distended.  There is a complex septated cystic mass within the dorsal left pelvis measuring up to 6.8 x 5.0 cm in axial dimension with differential including tubo-ovarian abscess.  There is a complex septated cystic mass within the ventral right pelvis which measures up to 6.0 by 4.3 cm in axial dimension with differential including tubo-ovarian abscess.     No evidence of gastrointestinal obstruction.  The appendix is not identified with certainty.  Vasculature grossly unremarkable.  Visualized osseous and surrounding soft tissue structures demonstrate  no acute abnormality.     Impression:     There is a complex septated cystic mass within the dorsal left pelvis measuring up to 6.8 x 5.0 cm in axial dimension with differential including tubo-ovarian abscess.  There is a complex septated cystic mass within the ventral right pelvis which measures up to 6.0 by 4.3 cm in axial dimension with differential including tubo-ovarian abscess.     The CT exam was performed using one or more of the following dose     reduction techniques- Automated exposure control, adjustment of the mA     and/or kV according to patient size, and/or use of iterative     reconstructed technique.     Point of Service: [facility]        Electronically signed by: Dread Warren  Date:                                            03/19/2023  Time:                                           11:30      The patient states that her pain is lower abdominal, mostly midline and does not radiate. Aggravating factors are movement, palpation. No alleviating factors (did respond with some improvement after 0.5mg Dilaudid in ER by ER team).    Deneis fever, chills.  Denies vaginal bleeding.  Denies abnormal vaginal discharge or odor.    Patient does have unprotected intercourse (would like to have a pregnancy and has stated as much in the past.)  Patient is noted through her clinic notes to have Hx of high risk sexual activity.  Her labs are positive for History of Syphilis, HSV 2 (though she denies any vulvovaginal or oral outbreaks).    She denies anal intercourse.    A urine specimen from today was sent for GC/CT PCR but patient received Rocephin yesterday and today.  I called the lab and they have a pretreatment specimen from 3/18 and will run off of that specimen as well since the first is in process.

## 2023-03-19 NOTE — NURSING
Received to the floor for ED via wheelchair. 18 G to left forearm, room air. Alert and oriented x4. Vital signs obtained and stable. NADN. Oriented to room. Safety ongoing, call bell in reach.

## 2023-03-19 NOTE — SUBJECTIVE & OBJECTIVE
OB History   No obstetric history on file.     No past medical history on file.  No past surgical history on file.    (Not in a hospital admission)      Review of patient's allergies indicates:  No Known Allergies     Family History    None       Tobacco Use    Smoking status: Every Day     Types: Cigars    Smokeless tobacco: Never   Substance and Sexual Activity    Alcohol use: Not Currently    Drug use: Not Currently    Sexual activity: Yes     Review of Systems   Constitutional:  Negative for chills and fever.   HENT:  Negative for mouth sores.    Eyes:  Negative for discharge and visual disturbance.   Respiratory:  Negative for cough and shortness of breath.    Cardiovascular:  Negative for chest pain and leg swelling.   Gastrointestinal:  Positive for abdominal pain. Negative for blood in stool, constipation, diarrhea, nausea and vomiting.   Genitourinary:  Positive for pelvic pain. Negative for dysuria, flank pain, frequency, genital sores, hematuria, menorrhagia, menstrual problem, urgency, vaginal bleeding, vaginal discharge, vaginal pain and vaginal odor.   Musculoskeletal:  Negative for back pain.   Neurological:  Negative for seizures, syncope and headaches.   Psychiatric/Behavioral:  Negative for depression. The patient is not nervous/anxious.     Objective:     Vital Signs (Most Recent):  Temp: 99.4 °F (37.4 °C) (03/19/23 1037)  Pulse: 91 (03/19/23 1235)  Resp: 16 (03/19/23 1235)  BP: (!) 130/92 (03/19/23 1037)  SpO2: 97 % (03/19/23 1037)   Vital Signs (24h Range):  Temp:  [99.4 °F (37.4 °C)] 99.4 °F (37.4 °C)  Pulse:  [] 91  Resp:  [16] 16  SpO2:  [97 %] 97 %  BP: (130)/(92) 130/92     Weight: 58.1 kg (128 lb)  Body mass index is 23.41 kg/m².    Patient's last menstrual period was 03/09/2023 (approximate).    Physical Exam:   Constitutional: She is oriented to person, place, and time. She appears well-developed and well-nourished.    HENT:   Head: Normocephalic and atraumatic.    Eyes: Pupils  are equal, round, and reactive to light.     Cardiovascular:  Normal rate, regular rhythm and normal heart sounds.      Exam reveals no edema.        Pulmonary/Chest: Effort normal and breath sounds normal. No respiratory distress.        Abdominal: Soft. Bowel sounds are normal. She exhibits no distension and no mass. There is abdominal tenderness. There is no rebound and no guarding.     Genitourinary:    Pelvic exam was performed with patient supine.   The external female genitalia was normal.   No external genitalia lesions identified,Genitalia hair distrobution normal .             Musculoskeletal: Normal range of motion.       Neurological: She is alert and oriented to person, place, and time. She displays normal reflexes. She exhibits normal muscle tone.    Skin: Skin is warm and dry. No rash noted. No erythema.    Psychiatric: She has a normal mood and affect. Her behavior is normal.     Laboratory:    Recent Results (from the past 24 hour(s))   Comprehensive metabolic panel    Collection Time: 03/19/23 10:55 AM   Result Value Ref Range    Sodium 138 136 - 145 mmol/L    Potassium 3.4 (L) 3.5 - 5.1 mmol/L    Chloride 101 98 - 107 mmol/L    CO2 24 21 - 32 mmol/L    Anion Gap 16 7 - 16 mmol/L    Glucose 89 74 - 106 mg/dL    BUN 15 7 - 18 mg/dL    Creatinine 0.87 0.55 - 1.02 mg/dL    BUN/Creatinine Ratio 17 6 - 20    Calcium 9.5 8.5 - 10.1 mg/dL    Total Protein 8.1 6.4 - 8.2 g/dL    Albumin 4.1 3.5 - 5.0 g/dL    Globulin 4.0 2.0 - 4.0 g/dL    A/G Ratio 1.0     Bilirubin, Total 1.2 >0.0 - 1.2 mg/dL    Alk Phos 79 37 - 98 U/L    ALT 15 13 - 56 U/L    AST 14 (L) 15 - 37 U/L    eGFR 96 >=60 mL/min/1.73m²   CBC with Differential    Collection Time: 03/19/23 10:55 AM   Result Value Ref Range    WBC 21.85 (H) 4.50 - 11.00 K/uL    RBC 4.08 (L) 4.20 - 5.40 M/uL    Hemoglobin 13.2 12.0 - 16.0 g/dL    Hematocrit 37.8 (L) 38.0 - 47.0 %    MCV 92.6 80.0 - 96.0 fL    MCH 32.4 (H) 27.0 - 31.0 pg    MCHC 34.9 32.0 - 36.0 g/dL     RDW 10.9 (L) 11.5 - 14.5 %    Platelet Count 295 150 - 400 K/uL    MPV 8.9 (L) 9.4 - 12.4 fL    Neutrophils % 85.7 (H) 53.0 - 65.0 %    Lymphocytes % 8.1 (L) 27.0 - 41.0 %    Monocytes % 5.5 2.0 - 6.0 %    Eosinophils % 0.0 (L) 1.0 - 4.0 %    Basophils % 0.2 0.0 - 1.0 %    Immature Granulocytes % 0.5 (H) 0.0 - 0.4 %    nRBC, Auto 0.0 <=0.0 %    Neutrophils, Abs 18.73 (H) 1.80 - 7.70 K/uL    Lymphocytes, Absolute 1.77 1.00 - 4.80 K/uL    Monocytes, Absolute 1.20 (H) 0.00 - 0.80 K/uL    Eosinophils, Absolute 0.00 0.00 - 0.50 K/uL    Basophils, Absolute 0.05 0.00 - 0.20 K/uL    Immature Granulocytes, Absolute 0.10 (H) 0.00 - 0.04 K/uL    nRBC, Absolute 0.00 <=0.00 x10e3/uL    Diff Type Auto    Urinalysis, Reflex to Urine Culture    Collection Time: 03/19/23 11:36 AM    Specimen: Urine, Clean Catch   Result Value Ref Range    Color, UA Yellow Colorless, Straw, Yellow, Light Yellow, Dark Yellow    Clarity, UA Clear Clear    pH, UA 6.0 5.0 to 8.0 pH Units    Leukocytes, UA Negative Negative    Nitrites, UA Negative Negative    Protein, UA 50 (A) Negative    Glucose, UA Normal Normal mg/dL    Ketones, UA 60 (A) Negative mg/dL    Urobilinogen, UA Normal 0.2, 1.0, Normal mg/dL    Bilirubin, UA Negative Negative    Blood, UA Small (A) Negative    Specific Gravity, UA >1.030 (H) <=1.030   SARS-CoV2 (COVID) with FLU Antigen    Collection Time: 03/19/23 11:36 AM   Result Value Ref Range    Influenza A Negative Negative, Invalid    Influenza B Negative Negative, Invalid    COVID-19 Ag Negative Negative, Invalid   Urinalysis, Microscopic    Collection Time: 03/19/23 11:36 AM   Result Value Ref Range    WBC, UA 0-5 None Seen, 0-5 /hpf    RBC, UA None Seen None Seen, 0-3 /hpf    Bacteria, UA Few (A) None Seen /hpf    Squamous Epithelial Cells, UA Few (A) None Seen, Rare, None Seen To Occasional /lpf   Lactic acid, plasma    Collection Time: 03/19/23 12:17 PM   Result Value Ref Range    Lactic Acid 0.8 0.4 - 2.0 mmol/L   Syphilis  Antibody with reflex to RPR    Collection Time: 03/19/23  2:00 PM   Result Value Ref Range    Syphilis Ab Interpretation Reactive (A) Non-Reactive   HIV 1/2 Ag/Ab (4th Gen)    Collection Time: 03/19/23  2:00 PM   Result Value Ref Range    HIV 1/2 Non-Reactive Non-Reactive        Recent Lab Results         03/19/23  1400   03/19/23  1217   03/19/23  1136   03/19/23  1055        Influenza B, Molecular     Negative         Albumin/Globulin Ratio       1.0       Albumin       4.1       Alkaline Phosphatase       79       ALT       15       Anion Gap       16       Appearance, UA     Clear         AST       14       Bacteria, UA     Few         Baso #       0.05       Basophil %       0.2       Bilirubin (UA)     Negative         BILIRUBIN TOTAL       1.2       BUN       15       BUN/CREAT RATIO       17       Calcium       9.5       Chloride       101       CO2       24       Color, UA     Yellow         COVID-19 Ag     Negative         Creatinine       0.87       Differential Type       Auto       eGFR       96       Eos #       0.00       Eosinophil %       0.0       Globulin, Total       4.0       Glucose       89       Glucose, UA     Normal         Hematocrit       37.8       Hemoglobin       13.2       HIV 1/2 Ag/Ab Non-Reactive             Immature Grans (Abs)       0.10       Immature Granulocytes       0.5       Influenza A     Negative         Ketones, UA     60         Lactate, Tarun   0.8           Leukocytes, UA     Negative         Lymph #       1.77       Lymph %       8.1       MCH       32.4       MCHC       34.9       MCV       92.6       Mono #       1.20       Mono %       5.5       MPV       8.9       Neutrophils, Abs       18.73       Neutrophils Relative       85.7       NITRITE UA     Negative         nRBC       0.0       NUCLEATED RBC ABSOLUTE       0.00       Occult Blood UA     Small         pH, UA     6.0         Platelets       295       Potassium       3.4       PROTEIN TOTAL       8.1        Protein, UA     50         RBC       4.08       RBC, UA     None Seen         RDW       10.9       Sodium       138       Specific Gravity, UA     >1.030         Squam Epithel, UA     Few         Syphilis Ab Interpretation Reactive  Comment: 0.0 - 0.9: Non-Reactive  0.91 - 1.10: Equivocal with RPR to follow  >1.10:  Reactive with RPR to Follow             UROBILINOGEN UA     Normal         WBC, UA     0-5         WBC       21.85             I have personallly reviewed all pertinent lab results from the last 24 hours.    Diagnostic Results:  CT: Reviewed  Suggestive of b/l TOA

## 2023-03-20 LAB
BASOPHILS # BLD AUTO: 0.04 K/UL (ref 0–0.2)
BASOPHILS NFR BLD AUTO: 0.3 % (ref 0–1)
DIFFERENTIAL METHOD BLD: ABNORMAL
EOSINOPHIL # BLD AUTO: 0.06 K/UL (ref 0–0.5)
EOSINOPHIL NFR BLD AUTO: 0.5 % (ref 1–4)
ERYTHROCYTE [DISTWIDTH] IN BLOOD BY AUTOMATED COUNT: 10.9 % (ref 11.5–14.5)
HCT VFR BLD AUTO: 32.5 % (ref 38–47)
HGB BLD-MCNC: 11 G/DL (ref 12–16)
IMM GRANULOCYTES # BLD AUTO: 0.06 K/UL (ref 0–0.04)
IMM GRANULOCYTES NFR BLD: 0.5 % (ref 0–0.4)
LYMPHOCYTES # BLD AUTO: 1.71 K/UL (ref 1–4.8)
LYMPHOCYTES NFR BLD AUTO: 14 % (ref 27–41)
MCH RBC QN AUTO: 32 PG (ref 27–31)
MCHC RBC AUTO-ENTMCNC: 33.8 G/DL (ref 32–36)
MCV RBC AUTO: 94.5 FL (ref 80–96)
MONOCYTES # BLD AUTO: 0.85 K/UL (ref 0–0.8)
MONOCYTES NFR BLD AUTO: 7 % (ref 2–6)
MPC BLD CALC-MCNC: 9.2 FL (ref 9.4–12.4)
NEUTROPHILS # BLD AUTO: 9.49 K/UL (ref 1.8–7.7)
NEUTROPHILS NFR BLD AUTO: 77.7 % (ref 53–65)
NRBC # BLD AUTO: 0 X10E3/UL
NRBC, AUTO (.00): 0 %
PLATELET # BLD AUTO: 230 K/UL (ref 150–400)
RBC # BLD AUTO: 3.44 M/UL (ref 4.2–5.4)
RPR SER-TITR: ABNORMAL {TITER}
WBC # BLD AUTO: 12.21 K/UL (ref 4.5–11)

## 2023-03-20 PROCEDURE — S0030 INJECTION, METRONIDAZOLE: HCPCS | Performed by: OBSTETRICS & GYNECOLOGY

## 2023-03-20 PROCEDURE — 25000003 PHARM REV CODE 250: Performed by: OBSTETRICS & GYNECOLOGY

## 2023-03-20 PROCEDURE — 11000001 HC ACUTE MED/SURG PRIVATE ROOM

## 2023-03-20 PROCEDURE — 63600175 PHARM REV CODE 636 W HCPCS: Performed by: OBSTETRICS & GYNECOLOGY

## 2023-03-20 PROCEDURE — 85025 COMPLETE CBC W/AUTO DIFF WBC: CPT | Performed by: OBSTETRICS & GYNECOLOGY

## 2023-03-20 RX ORDER — DIPHENHYDRAMINE HYDROCHLORIDE 50 MG/ML
25 INJECTION INTRAMUSCULAR; INTRAVENOUS EVERY 4 HOURS PRN
Status: DISCONTINUED | OUTPATIENT
Start: 2023-03-20 | End: 2023-03-23 | Stop reason: HOSPADM

## 2023-03-20 RX ADMIN — METRONIDAZOLE 500 MG: 500 INJECTION, SOLUTION INTRAVENOUS at 01:03

## 2023-03-20 RX ADMIN — SODIUM CHLORIDE: 9 INJECTION, SOLUTION INTRAVENOUS at 04:03

## 2023-03-20 RX ADMIN — HYDROMORPHONE HYDROCHLORIDE 1 MG: 2 INJECTION, SOLUTION INTRAMUSCULAR; INTRAVENOUS; SUBCUTANEOUS at 08:03

## 2023-03-20 RX ADMIN — DEXTROSE MONOHYDRATE 100 MG: 50 INJECTION, SOLUTION INTRAVENOUS at 12:03

## 2023-03-20 RX ADMIN — DIPHENHYDRAMINE HYDROCHLORIDE 25 MG: 25 CAPSULE ORAL at 01:03

## 2023-03-20 RX ADMIN — METRONIDAZOLE 500 MG: 500 INJECTION, SOLUTION INTRAVENOUS at 10:03

## 2023-03-20 RX ADMIN — METRONIDAZOLE 500 MG: 500 INJECTION, SOLUTION INTRAVENOUS at 04:03

## 2023-03-20 RX ADMIN — DEXTROSE MONOHYDRATE 100 MG: 50 INJECTION, SOLUTION INTRAVENOUS at 11:03

## 2023-03-20 RX ADMIN — DIPHENHYDRAMINE HYDROCHLORIDE 25 MG: 25 CAPSULE ORAL at 08:03

## 2023-03-20 RX ADMIN — DIPHENHYDRAMINE HYDROCHLORIDE 25 MG: 25 CAPSULE ORAL at 04:03

## 2023-03-20 RX ADMIN — DIPHENHYDRAMINE HYDROCHLORIDE 25 MG: 25 CAPSULE ORAL at 10:03

## 2023-03-20 RX ADMIN — IBUPROFEN 600 MG: 600 TABLET, FILM COATED ORAL at 10:03

## 2023-03-20 RX ADMIN — DEXTROSE MONOHYDRATE 1 G: 5 INJECTION INTRAVENOUS at 01:03

## 2023-03-20 NOTE — SUBJECTIVE & OBJECTIVE
Interval History: HD# 1(Not even 12 hours since onset of antibiotics)    Scheduled Meds:   cefTRIAXone (ROCEPHIN) IVPB  1 g Intravenous Q24H    diphenhydrAMINE  25 mg Oral Q4H    doxycycline (VIBRAMYCIN) IVPB  100 mg Intravenous Q12H    metronidazole  500 mg Intravenous Q8H     Continuous Infusions:   sodium chloride 0.9% 125 mL/hr at 03/20/23 0428     PRN Meds:acetaminophen, diphenhydrAMINE, HYDROmorphone, ibuprofen, morphine, ondansetron, prochlorperazine, sodium chloride 0.9%    Review of patient's allergies indicates:  No Known Allergies    Objective:     Vital Signs (Most Recent):  Temp: 98 °F (36.7 °C) (03/20/23 0700)  Pulse: 75 (03/20/23 0700)  Resp: 18 (03/20/23 0856)  BP: 106/74 (03/20/23 0700)  SpO2: 99 % (03/20/23 0700) Vital Signs (24h Range):  Temp:  [98 °F (36.7 °C)-100.3 °F (37.9 °C)] 98 °F (36.7 °C)  Pulse:  [] 75  Resp:  [16-18] 18  SpO2:  [97 %-99 %] 99 %  BP: (106-130)/(72-92) 106/74     Weight: 58.1 kg (128 lb)  Body mass index is 23.41 kg/m².  Patient's last menstrual period was 03/09/2023 (approximate).    I&O (Last 24H):    Intake/Output Summary (Last 24 hours) at 3/20/2023 1028  Last data filed at 3/19/2023 1740  Gross per 24 hour   Intake 1400 ml   Output --   Net 1400 ml         Laboratory:  CBC:   Recent Labs   Lab 03/20/23  0328   WBC 12.21*   RBC 3.44*   HGB 11.0*   HCT 32.5*      MCV 94.5   MCH 32.0*   MCHC 33.8     Chlamydia: No results for input(s): CHLAMYDIA in the last 48 hours.  CMP:   Recent Labs   Lab 03/19/23  1055   GLU 89   CALCIUM 9.5   ALBUMIN 4.1   PROT 8.1      K 3.4*   CO2 24      BUN 15   CREATININE 0.87   ALKPHOS 79   ALT 15   AST 14*   BILITOT 1.2     Gonorrhea: Invalid input(s): GONORRHEA  Urine Pregnancy Test: No results for input(s): PREGTESTUR in the last 48 hours.  Recent Labs   Lab 03/19/23  1136   COLORU Yellow   SPECGRAV >1.030*   PHUR 6.0   PROTEINUA 50*   BACTERIA Few*   NITRITE Negative   LEUKOCYTESUR Negative   UROBILINOGEN Normal      Recent Lab Results  (Last 5 results in the past 24 hours)        03/20/23  0328   03/19/23  1400   03/19/23  1217   03/19/23  1136   03/19/23  1055        Influenza B, Molecular       Negative         Albumin/Globulin Ratio         1.0       Albumin         4.1       Alkaline Phosphatase         79       ALT         15       Anion Gap         16       Appearance, UA       Clear         AST         14       Bacteria, UA       Few         Baso # 0.04         0.05       Basophil % 0.3         0.2       Bilirubin (UA)       Negative         BILIRUBIN TOTAL         1.2       BUN         15       BUN/CREAT RATIO         17       Calcium         9.5       Chloride         101       CO2         24       Color, UA       Yellow         COVID-19 Ag       Negative         Creatinine         0.87       Differential Type Auto         Auto       eGFR         96       Eos # 0.06         0.00       Eosinophil % 0.5         0.0       Globulin, Total         4.0       Glucose         89       Glucose, UA       Normal         Hematocrit 32.5         37.8       Hemoglobin 11.0         13.2       HIV 1/2 Ag/Ab   Non-Reactive             Immature Grans (Abs) 0.06         0.10       Immature Granulocytes 0.5         0.5       Influenza A       Negative         Ketones, UA       60         Lactate, Tarun     0.8           Leukocytes, UA       Negative         Lymph # 1.71         1.77       Lymph % 14.0         8.1       MCH 32.0         32.4       MCHC 33.8         34.9       MCV 94.5         92.6       Mono # 0.85         1.20       Mono % 7.0         5.5       MPV 9.2         8.9       Neutrophils, Abs 9.49         18.73       Neutrophils Relative 77.7         85.7       NITRITE UA       Negative         nRBC 0.0         0.0       NUCLEATED RBC ABSOLUTE 0.00         0.00       Occult Blood UA       Small         pH, UA       6.0         Platelets 230         295       Potassium         3.4       PROTEIN TOTAL         8.1       Protein,  UA       50         RBC 3.44         4.08       RBC, UA       None Seen         RDW 10.9         10.9       RPR   Reactive - 1:2  Comment: If the patient's history of syphilis is unknown it is recommended that the Syphilis Antibody by TP-PA test be ordered and performed when the Syphilis IgG antibody test is reactive and the RPR is non-reactive.             Sodium         138       Specific Gravity, UA       >1.030         Squam Epithel, UA       Few         Syphilis Ab Interpretation   Reactive  Comment: 0.0 - 0.9: Non-Reactive  0.91 - 1.10: Equivocal with RPR to follow  >1.10:  Reactive with RPR to Follow             UROBILINOGEN UA       Normal         WBC, UA       0-5         WBC 12.21         21.85                            I have personallly reviewed all pertinent lab results from the last 24 hours.    Diagnostic Results:  Labs: Reviewed  CT: Reviewed  RPR titer of 1:2  is lower than her last titer of 1:8   8 months ago.so does not require retreatment for syphilis.     Physical Exam:   Constitutional: She is oriented to person, place, and time. She appears well-developed and well-nourished. No distress.    HENT:   Head: Atraumatic.    Eyes: EOM are normal.     Cardiovascular:  Normal rate and regular rhythm.             Pulmonary/Chest: Effort normal. No respiratory distress.        Abdominal: Soft.             Musculoskeletal: Moves all extremeties.       Neurological: She is alert and oriented to person, place, and time.     Psychiatric: She has a normal mood and affect. Her behavior is normal.     Review of Systems   Constitutional:  Negative for appetite change, chills, diaphoresis, fatigue and fever.   HENT: Negative.     Eyes: Negative.    Respiratory:  Negative for cough and shortness of breath.    Cardiovascular:  Negative for chest pain.   Gastrointestinal:  Positive for abdominal pain. Negative for constipation, diarrhea, nausea, vomiting and reflux.   Genitourinary:  Negative for dysuria,  frequency and urgency.   Musculoskeletal:  Negative for back pain.   Neurological:  Negative for headaches.

## 2023-03-20 NOTE — HOSPITAL COURSE
3/20/23 HD#1  Patient reports decrease in abdominal discomfort since admission.  She is had no nausea or vomiting and had a regular bowel movement last evening.  She is tolerating a regular diet.  She is afebrile and is currently receiving Rocephin 1 g Q 24 hours (1st dose at 12:00 p.m. on 03/20/2023) doxycycline 100 mg q.12 hours 1st dose was given on 03/19/2023 at 11:00 p.m.) and Flagyl 500 mg q.8 hours,  the initial dose given on 03/19/2023 at 8:00 p.m..  Her WBC is 12.21 today (21.85 yesterday) she is stable at this point and will continue antibiotics as ordered for now.  Will plan to consult Interventional Radiology for consideration of placement of drains.

## 2023-03-20 NOTE — ASSESSMENT & PLAN NOTE
Admit to inpatient    IV antibiotics  Rocephin  Flagyl  Doxycycline    IV Fluids (NS since LR is not compatible with Rocephin)      3/20/23 continue above antibiotics.  Pain management  Ibuprofen  Tylenol  Dilaudid    Anti-emetics    A urine specimen from today was sent for GC/CT PCR but patient received Rocephin yesterday and today.  I called the lab and they have a pretreatment specimen from 3/18 and will run off of that specimen as well since the first is in process.    3/20/23 continue above antibiotics.

## 2023-03-20 NOTE — PROGRESS NOTES
[facility]  Obstetrics & Gynecology  Progress Note    Patient Name: Kavon Denise  MRN: [id number]  Admission Date: 3/19/2023  Primary Care Provider: Primary Doctor No  Principal Problem: Tubo-ovarian abscess    Subjective:     HPI:    3/19/23  Unassigned patient - Has seen Erica Luke CNM and Keya Ruff CNM in clinic in 2021.    Patient is a very pleasant 24yo G0, LMP 3/5-3/9, not on contraception that presents to the ER within 24hr of previous with new-onset abdominal pain    Patient presented to the ER at [facility] on 3/18 with N/V and body aches. After evaluation it was felt she had a UTI and was given a dose of Rocephin and Dcd with a Bactrim Rx. Her abdomen was nontender.    The patient returned today with complaint of abdominal tenderness. Her CBC noted an increased WBC to 21.8 from 18.0.  A CT scan was obtained that was notable for likely TOA - bilateral.    CT ABDOMEN PELVIS WITH CONTRAST     CLINICAL HISTORY:  Abdominal abscess/infection suspected;     COMPARISON:  CT abdomen pelvis March 29, 2019     TECHNIQUE:  Multiple axial tomographic images of the abdomen and pelvis were obtained after administration of 100 cc Isovue 370 intravenous contrast.     FINDINGS:  Mild dependent changes of the lungs noted.     Subcentimeter hypodensity demonstrated within the right lobe of the liver which is too small to characterize but may reflect a cyst.  Visualized gallbladder grossly unremarkable.  Visualized pancreas appears unremarkable.  Spleen grossly unremarkable.     Bilateral adrenal glands grossly unremarkable.  Bilateral kidneys appear grossly unremarkable.  Urinary bladder incompletely distended.  There is a complex septated cystic mass within the dorsal left pelvis measuring up to 6.8 x 5.0 cm in axial dimension with differential including tubo-ovarian abscess.  There is a complex septated cystic mass within the ventral right pelvis which measures up to 6.0 by 4.3 cm in axial  dimension with differential including tubo-ovarian abscess.     No evidence of gastrointestinal obstruction.  The appendix is not identified with certainty.  Vasculature grossly unremarkable.  Visualized osseous and surrounding soft tissue structures demonstrate no acute abnormality.     Impression:     There is a complex septated cystic mass within the dorsal left pelvis measuring up to 6.8 x 5.0 cm in axial dimension with differential including tubo-ovarian abscess.  There is a complex septated cystic mass within the ventral right pelvis which measures up to 6.0 by 4.3 cm in axial dimension with differential including tubo-ovarian abscess.     The CT exam was performed using one or more of the following dose     reduction techniques- Automated exposure control, adjustment of the mA     and/or kV according to patient size, and/or use of iterative     reconstructed technique.     Point of Service: [facility]        Electronically signed by: Dread Warren  Date:                                            03/19/2023  Time:                                           11:30      The patient states that her pain is lower abdominal, mostly midline and does not radiate. Aggravating factors are movement, palpation. No alleviating factors (did respond with some improvement after 0.5mg Dilaudid in ER by ER team).    Deneis fever, chills.  Denies vaginal bleeding.  Denies abnormal vaginal discharge or odor.    Patient does have unprotected intercourse (would like to have a pregnancy and has stated as much in the past.)  Patient is noted through her clinic notes to have Hx of high risk sexual activity.  Her labs are positive for History of Syphilis, HSV 2 (though she denies any vulvovaginal or oral outbreaks).    She denies anal intercourse.    A urine specimen from today was sent for GC/CT PCR but patient received Rocephin yesterday and today.  I called the lab and they have a pretreatment specimen from 3/18 and  will run off of that specimen as well since the first is in process.  3/20/23 HD#1  Patient reports decrease in abdominal discomfort since admission.  She is had no nausea or vomiting and had a regular bowel movement last evening.  She is tolerating a regular diet.  She is afebrile and is currently receiving Rocephin 1 g Q 24 hours (1st dose at 12:00 p.m. on 03/20/2023) doxycycline 100 mg q.12 hours 1st dose was given on 03/19/2023 at 11:00 p.m.) and Flagyl 500 mg q.8 hours,  the initial dose given on 03/19/2023 at 8:00 p.m..  Her WBC is 12.21 today (21.85 yesterday) she is stable at this point and will continue antibiotics as ordered for now.  Will plan to consult Interventional Radiology for consideration of placement of drains.    Interval History: HD# 1(Not even 12 hours since onset of antibiotics)    Scheduled Meds:   cefTRIAXone (ROCEPHIN) IVPB  1 g Intravenous Q24H    diphenhydrAMINE  25 mg Oral Q4H    doxycycline (VIBRAMYCIN) IVPB  100 mg Intravenous Q12H    metronidazole  500 mg Intravenous Q8H     Continuous Infusions:   sodium chloride 0.9% 125 mL/hr at 03/20/23 0428     PRN Meds:acetaminophen, diphenhydrAMINE, HYDROmorphone, ibuprofen, morphine, ondansetron, prochlorperazine, sodium chloride 0.9%    Review of patient's allergies indicates:  No Known Allergies    Objective:     Vital Signs (Most Recent):  Temp: 98 °F (36.7 °C) (03/20/23 0700)  Pulse: 75 (03/20/23 0700)  Resp: 18 (03/20/23 0856)  BP: 106/74 (03/20/23 0700)  SpO2: 99 % (03/20/23 0700) Vital Signs (24h Range):  Temp:  [98 °F (36.7 °C)-100.3 °F (37.9 °C)] 98 °F (36.7 °C)  Pulse:  [] 75  Resp:  [16-18] 18  SpO2:  [97 %-99 %] 99 %  BP: (106-130)/(72-92) 106/74     Weight: 58.1 kg (128 lb)  Body mass index is 23.41 kg/m².  Patient's last menstrual period was 03/09/2023 (approximate).    I&O (Last 24H):    Intake/Output Summary (Last 24 hours) at 3/20/2023 1028  Last data filed at 3/19/2023 1740  Gross per 24 hour   Intake 1400 ml    Output --   Net 1400 ml         Laboratory:  CBC:   Recent Labs   Lab 03/20/23  0328   WBC 12.21*   RBC 3.44*   HGB 11.0*   HCT 32.5*      MCV 94.5   MCH 32.0*   MCHC 33.8     Chlamydia: No results for input(s): CHLAMYDIA in the last 48 hours.  CMP:   Recent Labs   Lab 03/19/23  1055   GLU 89   CALCIUM 9.5   ALBUMIN 4.1   PROT 8.1      K 3.4*   CO2 24      BUN 15   CREATININE 0.87   ALKPHOS 79   ALT 15   AST 14*   BILITOT 1.2     Gonorrhea: Invalid input(s): GONORRHEA  Urine Pregnancy Test: No results for input(s): PREGTESTUR in the last 48 hours.  Recent Labs   Lab 03/19/23  1136   COLORU Yellow   SPECGRAV >1.030*   PHUR 6.0   PROTEINUA 50*   BACTERIA Few*   NITRITE Negative   LEUKOCYTESUR Negative   UROBILINOGEN Normal     Recent Lab Results  (Last 5 results in the past 24 hours)        03/20/23  0328   03/19/23  1400   03/19/23  1217   03/19/23  1136   03/19/23  1055        Influenza B, Molecular       Negative         Albumin/Globulin Ratio         1.0       Albumin         4.1       Alkaline Phosphatase         79       ALT         15       Anion Gap         16       Appearance, UA       Clear         AST         14       Bacteria, UA       Few         Baso # 0.04         0.05       Basophil % 0.3         0.2       Bilirubin (UA)       Negative         BILIRUBIN TOTAL         1.2       BUN         15       BUN/CREAT RATIO         17       Calcium         9.5       Chloride         101       CO2         24       Color, UA       Yellow         COVID-19 Ag       Negative         Creatinine         0.87       Differential Type Auto         Auto       eGFR         96       Eos # 0.06         0.00       Eosinophil % 0.5         0.0       Globulin, Total         4.0       Glucose         89       Glucose, UA       Normal         Hematocrit 32.5         37.8       Hemoglobin 11.0         13.2       HIV 1/2 Ag/Ab   Non-Reactive             Immature Grans (Abs) 0.06         0.10       Immature  Granulocytes 0.5         0.5       Influenza A       Negative         Ketones, UA       60         Lactate, Tarun     0.8           Leukocytes, UA       Negative         Lymph # 1.71         1.77       Lymph % 14.0         8.1       MCH 32.0         32.4       MCHC 33.8         34.9       MCV 94.5         92.6       Mono # 0.85         1.20       Mono % 7.0         5.5       MPV 9.2         8.9       Neutrophils, Abs 9.49         18.73       Neutrophils Relative 77.7         85.7       NITRITE UA       Negative         nRBC 0.0         0.0       NUCLEATED RBC ABSOLUTE 0.00         0.00       Occult Blood UA       Small         pH, UA       6.0         Platelets 230         295       Potassium         3.4       PROTEIN TOTAL         8.1       Protein, UA       50         RBC 3.44         4.08       RBC, UA       None Seen         RDW 10.9         10.9       RPR   Reactive - 1:2  Comment: If the patient's history of syphilis is unknown it is recommended that the Syphilis Antibody by TP-PA test be ordered and performed when the Syphilis IgG antibody test is reactive and the RPR is non-reactive.             Sodium         138       Specific Gravity, UA       >1.030         Squam Epithel, UA       Few         Syphilis Ab Interpretation   Reactive  Comment: 0.0 - 0.9: Non-Reactive  0.91 - 1.10: Equivocal with RPR to follow  >1.10:  Reactive with RPR to Follow             UROBILINOGEN UA       Normal         WBC, UA       0-5         WBC 12.21         21.85                            I have personallly reviewed all pertinent lab results from the last 24 hours.    Diagnostic Results:  Labs: Reviewed  CT: Reviewed  RPR titer of 1:2  is lower than her last titer of 1:8   8 months ago.so does not require retreatment for syphilis.     Physical Exam:   Constitutional: She is oriented to person, place, and time. She appears well-developed and well-nourished. No distress.    HENT:   Head: Atraumatic.    Eyes: EOM are normal.      Cardiovascular:  Normal rate and regular rhythm.             Pulmonary/Chest: Effort normal. No respiratory distress.        Abdominal: Soft.             Musculoskeletal: Moves all extremeties.       Neurological: She is alert and oriented to person, place, and time.     Psychiatric: She has a normal mood and affect. Her behavior is normal.     Review of Systems   Constitutional:  Negative for appetite change, chills, diaphoresis, fatigue and fever.   HENT: Negative.     Eyes: Negative.    Respiratory:  Negative for cough and shortness of breath.    Cardiovascular:  Negative for chest pain.   Gastrointestinal:  Positive for abdominal pain. Negative for constipation, diarrhea, nausea, vomiting and reflux.   Genitourinary:  Negative for dysuria, frequency and urgency.   Musculoskeletal:  Negative for back pain.   Neurological:  Negative for headaches.       Assessment/Plan:     * Tubo-ovarian abscess    Admit to inpatient    IV antibiotics  Rocephin  Flagyl  Doxycycline    IV Fluids (NS since LR is not compatible with Rocephin)      3/20/23 continue above antibiotics.  Pain management  Ibuprofen  Tylenol  Dilaudid    Anti-emetics    A urine specimen from today was sent for GC/CT PCR but patient received Rocephin yesterday and today.  I called the lab and they have a pretreatment specimen from 3/18 and will run off of that specimen as well since the first is in process.    3/20/23 continue above antibiotics.      Positive serology for syphilis    Confirmatory testing being done    Patient has Hx of Syphilis  RPR titer now 1:2 from 1:8 eight months ago. She does not require treatment for syphilis at this time.         Tal Baeza MD  OB Hospitalist  Hospitalist phone: [phone]  03/20/2023   11:49 AM

## 2023-03-20 NOTE — ASSESSMENT & PLAN NOTE
Confirmatory testing being done    Patient has Hx of Syphilis  RPR titer now 1:2 from 1:8 eight months ago. She does not require treatment for syphilis at this time.

## 2023-03-21 LAB
ALBUMIN SERPL BCP-MCNC: 2.7 G/DL (ref 3.5–5)
ALBUMIN/GLOB SERPL: 0.7 {RATIO}
ALP SERPL-CCNC: 52 U/L (ref 37–98)
ALT SERPL W P-5'-P-CCNC: 12 U/L (ref 13–56)
ANION GAP SERPL CALCULATED.3IONS-SCNC: 12 MMOL/L (ref 7–16)
APTT PPP: 34.2 SECONDS (ref 25.2–37.3)
AST SERPL W P-5'-P-CCNC: 9 U/L (ref 15–37)
BASOPHILS # BLD AUTO: 0.03 K/UL (ref 0–0.2)
BASOPHILS NFR BLD AUTO: 0.4 % (ref 0–1)
BILIRUB SERPL-MCNC: 0.6 MG/DL (ref ?–1.2)
BUN SERPL-MCNC: 3 MG/DL (ref 7–18)
BUN/CREAT SERPL: 4 (ref 6–20)
CALCIUM SERPL-MCNC: 8.4 MG/DL (ref 8.5–10.1)
CHLORIDE SERPL-SCNC: 105 MMOL/L (ref 98–107)
CO2 SERPL-SCNC: 26 MMOL/L (ref 21–32)
CREAT SERPL-MCNC: 0.72 MG/DL (ref 0.55–1.02)
DIFFERENTIAL METHOD BLD: ABNORMAL
EGFR (NO RACE VARIABLE) (RUSH/TITUS): 120 ML/MIN/1.73M²
EOSINOPHIL # BLD AUTO: 0.05 K/UL (ref 0–0.5)
EOSINOPHIL NFR BLD AUTO: 0.6 % (ref 1–4)
ERYTHROCYTE [DISTWIDTH] IN BLOOD BY AUTOMATED COUNT: 10.9 % (ref 11.5–14.5)
GLOBULIN SER-MCNC: 3.9 G/DL (ref 2–4)
GLUCOSE SERPL-MCNC: 94 MG/DL (ref 74–106)
GRAM STN SPEC: NORMAL
GRAM STN SPEC: NORMAL
HCT VFR BLD AUTO: 32.7 % (ref 38–47)
HGB BLD-MCNC: 10.8 G/DL (ref 12–16)
IMM GRANULOCYTES # BLD AUTO: 0.04 K/UL (ref 0–0.04)
IMM GRANULOCYTES NFR BLD: 0.5 % (ref 0–0.4)
INR BLD: 1.24
LYMPHOCYTES # BLD AUTO: 1.94 K/UL (ref 1–4.8)
LYMPHOCYTES NFR BLD AUTO: 25 % (ref 27–41)
MCH RBC QN AUTO: 32 PG (ref 27–31)
MCHC RBC AUTO-ENTMCNC: 33 G/DL (ref 32–36)
MCV RBC AUTO: 96.7 FL (ref 80–96)
MONOCYTES # BLD AUTO: 0.99 K/UL (ref 0–0.8)
MONOCYTES NFR BLD AUTO: 12.8 % (ref 2–6)
MPC BLD CALC-MCNC: 9.4 FL (ref 9.4–12.4)
NEUTROPHILS # BLD AUTO: 4.7 K/UL (ref 1.8–7.7)
NEUTROPHILS NFR BLD AUTO: 60.7 % (ref 53–65)
NRBC # BLD AUTO: 0 X10E3/UL
NRBC, AUTO (.00): 0 %
PLATELET # BLD AUTO: 275 K/UL (ref 150–400)
POTASSIUM SERPL-SCNC: 3 MMOL/L (ref 3.5–5.1)
PROT SERPL-MCNC: 6.6 G/DL (ref 6.4–8.2)
PROTHROMBIN TIME: 15.1 SECONDS (ref 11.7–14.7)
RBC # BLD AUTO: 3.38 M/UL (ref 4.2–5.4)
SODIUM SERPL-SCNC: 140 MMOL/L (ref 136–145)
WBC # BLD AUTO: 7.75 K/UL (ref 4.5–11)

## 2023-03-21 PROCEDURE — 85730 THROMBOPLASTIN TIME PARTIAL: CPT | Performed by: STUDENT IN AN ORGANIZED HEALTH CARE EDUCATION/TRAINING PROGRAM

## 2023-03-21 PROCEDURE — 63600175 PHARM REV CODE 636 W HCPCS: Performed by: OBSTETRICS & GYNECOLOGY

## 2023-03-21 PROCEDURE — 25000003 PHARM REV CODE 250: Performed by: OBSTETRICS & GYNECOLOGY

## 2023-03-21 PROCEDURE — 63600175 PHARM REV CODE 636 W HCPCS: Performed by: STUDENT IN AN ORGANIZED HEALTH CARE EDUCATION/TRAINING PROGRAM

## 2023-03-21 PROCEDURE — 94761 N-INVAS EAR/PLS OXIMETRY MLT: CPT

## 2023-03-21 PROCEDURE — S0030 INJECTION, METRONIDAZOLE: HCPCS | Performed by: OBSTETRICS & GYNECOLOGY

## 2023-03-21 PROCEDURE — 87205 SMEAR GRAM STAIN: CPT | Performed by: STUDENT IN AN ORGANIZED HEALTH CARE EDUCATION/TRAINING PROGRAM

## 2023-03-21 PROCEDURE — 87070 CULTURE OTHR SPECIMN AEROBIC: CPT | Performed by: STUDENT IN AN ORGANIZED HEALTH CARE EDUCATION/TRAINING PROGRAM

## 2023-03-21 PROCEDURE — 80053 COMPREHEN METABOLIC PANEL: CPT | Performed by: OBSTETRICS & GYNECOLOGY

## 2023-03-21 PROCEDURE — 11000001 HC ACUTE MED/SURG PRIVATE ROOM

## 2023-03-21 PROCEDURE — 85025 COMPLETE CBC W/AUTO DIFF WBC: CPT | Performed by: OBSTETRICS & GYNECOLOGY

## 2023-03-21 RX ORDER — POTASSIUM CHLORIDE 20 MEQ/1
20 TABLET, EXTENDED RELEASE ORAL 2 TIMES DAILY
Status: COMPLETED | OUTPATIENT
Start: 2023-03-21 | End: 2023-03-23

## 2023-03-21 RX ORDER — MIDAZOLAM HYDROCHLORIDE 1 MG/ML
INJECTION INTRAMUSCULAR; INTRAVENOUS
Status: COMPLETED | OUTPATIENT
Start: 2023-03-21 | End: 2023-03-21

## 2023-03-21 RX ORDER — FENTANYL CITRATE 50 UG/ML
INJECTION, SOLUTION INTRAMUSCULAR; INTRAVENOUS
Status: COMPLETED | OUTPATIENT
Start: 2023-03-21 | End: 2023-03-21

## 2023-03-21 RX ADMIN — ONDANSETRON HYDROCHLORIDE 4 MG: 2 SOLUTION INTRAMUSCULAR; INTRAVENOUS at 08:03

## 2023-03-21 RX ADMIN — METRONIDAZOLE 500 MG: 500 INJECTION, SOLUTION INTRAVENOUS at 10:03

## 2023-03-21 RX ADMIN — POTASSIUM CHLORIDE 20 MEQ: 1500 TABLET, EXTENDED RELEASE ORAL at 10:03

## 2023-03-21 RX ADMIN — SODIUM CHLORIDE: 9 INJECTION, SOLUTION INTRAVENOUS at 07:03

## 2023-03-21 RX ADMIN — DIPHENHYDRAMINE HYDROCHLORIDE 25 MG: 25 CAPSULE ORAL at 05:03

## 2023-03-21 RX ADMIN — METRONIDAZOLE 500 MG: 500 INJECTION, SOLUTION INTRAVENOUS at 01:03

## 2023-03-21 RX ADMIN — METRONIDAZOLE 500 MG: 500 INJECTION, SOLUTION INTRAVENOUS at 05:03

## 2023-03-21 RX ADMIN — MIDAZOLAM HYDROCHLORIDE 1 MG: 1 INJECTION, SOLUTION INTRAMUSCULAR; INTRAVENOUS at 10:03

## 2023-03-21 RX ADMIN — DEXTROSE MONOHYDRATE 100 MG: 50 INJECTION, SOLUTION INTRAVENOUS at 01:03

## 2023-03-21 RX ADMIN — DIPHENHYDRAMINE HYDROCHLORIDE 25 MG: 25 CAPSULE ORAL at 10:03

## 2023-03-21 RX ADMIN — DEXTROSE MONOHYDRATE 1 G: 5 INJECTION INTRAVENOUS at 01:03

## 2023-03-21 RX ADMIN — FENTANYL CITRATE 25 MCG: 50 INJECTION INTRAMUSCULAR; INTRAVENOUS at 10:03

## 2023-03-21 RX ADMIN — SODIUM CHLORIDE: 9 INJECTION, SOLUTION INTRAVENOUS at 05:03

## 2023-03-21 RX ADMIN — DIPHENHYDRAMINE HYDROCHLORIDE 25 MG: 25 CAPSULE ORAL at 01:03

## 2023-03-21 NOTE — SUBJECTIVE & OBJECTIVE
Interval History: HD#3  Patient reports decreased discomfort but has had occasional nausea and 1 episode of vomiting today.  She stated later in the afternoon that she was not going to continue receiving IV antibiotics as she is convinced that the antibiotics are causing her nausea.  She is quite adamant in this and is not open to discussion.  She reports that she is having a normal bowel movement daily.  Her Flagyl and doxycycline will be given p.o. and her Rocephin will be given IM tonight.  She continues to be afebrile with a normal white count now.  An attempt me was made yesterday to place a drain in a collection of fluid but this was unsuccessful.  They did aspirate approximately 15 mL of yellow fluid which so far has shown no growth.  She understands she will need to continue the antibiotics after discharge.    Scheduled Meds:     cefTRIAXone (ROCEPHIN) IVPB  1 g Intravenous Q24H    diphenhydrAMINE  25 mg Oral Q4H    doxycycline (VIBRAMYCIN) IVPB  100 mg Intravenous Q12H    metronidazole  500 mg Intravenous Q8H     Continuous Infusions:   sodium chloride 0.9% 150 mL/hr at 03/21/23 0832     PRN Meds:acetaminophen, diphenhydrAMINE, HYDROmorphone, ibuprofen, morphine, ondansetron, prochlorperazine, sodium chloride 0.9%    Review of patient's allergies indicates:  No Known Allergies    Objective:     Vital Signs (Most Recent):  Temp: 99 °F (37.2 °C) (03/21/23 0630)  Pulse: 79 (03/21/23 1106)  Resp: 15 (03/21/23 1106)  BP: 118/76 (03/21/23 1106)  SpO2: 100 % (03/21/23 1106) Vital Signs (24h Range):  Temp:  [98.1 °F (36.7 °C)-101.1 °F (38.4 °C)] 99 °F (37.2 °C)  Pulse:  [] 79  Resp:  [15-20] 15  SpO2:  [97 %-100 %] 100 %  BP: (106-140)/(71-93) 118/76     Weight: 58.1 kg (128 lb)  Body mass index is 23.41 kg/m².  Patient's last menstrual period was 03/09/2023 (approximate).    I&O (Last 24H):  No intake or output data in the 24 hours ending 03/21/23 7193      Laboratory:  CBC:   Recent Labs   Lab  03/22/23  0332   WBC 7.04   RBC 3.31*   HGB 10.5*   HCT 30.4*      MCV 91.8   MCH 31.7*   MCHC 34.5     Recent Lab Results         03/22/23  0332        Anion Gap 12       Baso # 0.02       Basophil % 0.3       BUN 3       BUN/CREAT RATIO 4       Calcium 8.5       Chloride 104       CO2 26       Creatinine 0.67       Differential Type Auto       eGFR 125       Eos # 0.02       Eosinophil % 0.3       Glucose 88       Hematocrit 30.4       Hemoglobin 10.5       Immature Grans (Abs) 0.03       Immature Granulocytes 0.4       Lymph # 1.64       Lymph % 23.3       MCH 31.7       MCHC 34.5       MCV 91.8       Mono # 1.03       Mono % 14.6       MPV 8.9       Neutrophils, Abs 4.30       Neutrophils Relative 61.1       nRBC 0.0       NUCLEATED RBC ABSOLUTE 0.00       Platelets 284       Potassium 3.3       RBC 3.31       RDW 10.6       Sodium 139       WBC 7.04             I have personallly reviewed all pertinent lab results from the last 24 hours.    Diagnostic Results:  CT: Reviewed      Physical Exam:   Constitutional: She appears well-developed and well-nourished. No distress.       Cardiovascular:  Normal rate and regular rhythm.             Pulmonary/Chest: Effort normal. No respiratory distress.        Abdominal: Soft. She exhibits no distension. There is no abdominal tenderness.             Musculoskeletal: Moves all extremeties.       Neurological: She is alert.    Skin: Skin is warm.      Review of Systems   Constitutional:  Negative for appetite change, chills and fever.   HENT: Negative.     Eyes: Negative.    Respiratory:  Negative for cough and shortness of breath.    Gastrointestinal:  Positive for nausea and vomiting. Negative for abdominal pain, constipation and diarrhea.   Musculoskeletal:  Negative for back pain.

## 2023-03-21 NOTE — PROGRESS NOTES
Interventional radiology consulted for an abscess drain.  Informed consent has been obtained.  History and physical has been reviewed.

## 2023-03-21 NOTE — PROCEDURES
CT guided fluid aspiration performed by Dr. Haines and assisted by Valeriy GUY.  Patient was prepped with Betadine and draped in sterile fashion.  Formal time-out was called with all present in agreement.  Initially a drain was to be placed in the posterior pelvic fluid collection however a safe pathway to place this drain was not available.  6 cc 1% lidocaine infused.  Anterior pelvic fluid collection was then accessed with an 18 gauge needle with CT guidance.  Approximately 15 cc of thin yellow fluid obtained.  Anterior Fluid collection was too small to place a drainage catheter.  Specimen sent to the lab for analysis.  Specimen transport to the lab by David Garza RN.  Patient tolerated procedure well with no immediate complication. Total sedation time from 1038 till 1108.

## 2023-03-21 NOTE — PROGRESS NOTES
"Laborist On Call  HD#:  3    S:  Pain 6/10:  Denies:  N/V/SOB/CP/Palpitations/F/C or s/s:  vaginitis, dysuria, or bowel changes    O.  /84   Pulse 79   Temp 99 °F (37.2 °C)   Resp 18   Ht 5' 2" (1.575 m)   Wt 58.1 kg (128 lb)   LMP 03/09/2023 (Approximate)   SpO2 97%   Breastfeeding No   BMI 23.41 kg/m²       Cor:  nml S1S2, RRR  Pulm:  CTaB  Abd:  Hypoactive bowel sounds, Distended, Diffuse tenderness, (-)G/ (+)Rebound  SVE:  Deferred  Back:  (-)CVA tenderness  Ext:  (-)C/C/E, NT    A/p:  24y/o AAG0 w/ B TOA:  stable    Continue current antibiotic regime:  GC/Chlm P:  (+)Hx RPR/HSV(II)  Consult IR for possible drain placement  NPO  Cr:  1.1 to 0.8 Continue IVF/ CMP  All questions answered; patient exemplified understanding  "

## 2023-03-22 LAB
ANION GAP SERPL CALCULATED.3IONS-SCNC: 12 MMOL/L (ref 7–16)
BASOPHILS # BLD AUTO: 0.02 K/UL (ref 0–0.2)
BASOPHILS NFR BLD AUTO: 0.3 % (ref 0–1)
BUN SERPL-MCNC: 3 MG/DL (ref 7–18)
BUN/CREAT SERPL: 4 (ref 6–20)
CALCIUM SERPL-MCNC: 8.5 MG/DL (ref 8.5–10.1)
CHLORIDE SERPL-SCNC: 104 MMOL/L (ref 98–107)
CO2 SERPL-SCNC: 26 MMOL/L (ref 21–32)
CREAT SERPL-MCNC: 0.67 MG/DL (ref 0.55–1.02)
DIFFERENTIAL METHOD BLD: ABNORMAL
EGFR (NO RACE VARIABLE) (RUSH/TITUS): 125 ML/MIN/1.73M²
EOSINOPHIL # BLD AUTO: 0.02 K/UL (ref 0–0.5)
EOSINOPHIL NFR BLD AUTO: 0.3 % (ref 1–4)
ERYTHROCYTE [DISTWIDTH] IN BLOOD BY AUTOMATED COUNT: 10.6 % (ref 11.5–14.5)
GLUCOSE SERPL-MCNC: 88 MG/DL (ref 74–106)
HCT VFR BLD AUTO: 30.4 % (ref 38–47)
HGB BLD-MCNC: 10.5 G/DL (ref 12–16)
IMM GRANULOCYTES # BLD AUTO: 0.03 K/UL (ref 0–0.04)
IMM GRANULOCYTES NFR BLD: 0.4 % (ref 0–0.4)
LYMPHOCYTES # BLD AUTO: 1.64 K/UL (ref 1–4.8)
LYMPHOCYTES NFR BLD AUTO: 23.3 % (ref 27–41)
MCH RBC QN AUTO: 31.7 PG (ref 27–31)
MCHC RBC AUTO-ENTMCNC: 34.5 G/DL (ref 32–36)
MCV RBC AUTO: 91.8 FL (ref 80–96)
MONOCYTES # BLD AUTO: 1.03 K/UL (ref 0–0.8)
MONOCYTES NFR BLD AUTO: 14.6 % (ref 2–6)
MPC BLD CALC-MCNC: 8.9 FL (ref 9.4–12.4)
NEUTROPHILS # BLD AUTO: 4.3 K/UL (ref 1.8–7.7)
NEUTROPHILS NFR BLD AUTO: 61.1 % (ref 53–65)
NRBC # BLD AUTO: 0 X10E3/UL
NRBC, AUTO (.00): 0 %
PLATELET # BLD AUTO: 284 K/UL (ref 150–400)
POTASSIUM SERPL-SCNC: 3.3 MMOL/L (ref 3.5–5.1)
RBC # BLD AUTO: 3.31 M/UL (ref 4.2–5.4)
SODIUM SERPL-SCNC: 139 MMOL/L (ref 136–145)
WBC # BLD AUTO: 7.04 K/UL (ref 4.5–11)

## 2023-03-22 PROCEDURE — 25000003 PHARM REV CODE 250: Performed by: OBSTETRICS & GYNECOLOGY

## 2023-03-22 PROCEDURE — 11000001 HC ACUTE MED/SURG PRIVATE ROOM

## 2023-03-22 PROCEDURE — 80048 BASIC METABOLIC PNL TOTAL CA: CPT | Performed by: OBSTETRICS & GYNECOLOGY

## 2023-03-22 PROCEDURE — 85025 COMPLETE CBC W/AUTO DIFF WBC: CPT | Performed by: OBSTETRICS & GYNECOLOGY

## 2023-03-22 PROCEDURE — 63600175 PHARM REV CODE 636 W HCPCS: Performed by: OBSTETRICS & GYNECOLOGY

## 2023-03-22 PROCEDURE — 94761 N-INVAS EAR/PLS OXIMETRY MLT: CPT

## 2023-03-22 PROCEDURE — S0030 INJECTION, METRONIDAZOLE: HCPCS | Performed by: OBSTETRICS & GYNECOLOGY

## 2023-03-22 RX ADMIN — ONDANSETRON HYDROCHLORIDE 4 MG: 2 SOLUTION INTRAMUSCULAR; INTRAVENOUS at 11:03

## 2023-03-22 RX ADMIN — POTASSIUM CHLORIDE 20 MEQ: 1500 TABLET, EXTENDED RELEASE ORAL at 09:03

## 2023-03-22 RX ADMIN — METRONIDAZOLE 500 MG: 500 INJECTION, SOLUTION INTRAVENOUS at 03:03

## 2023-03-22 RX ADMIN — DEXTROSE MONOHYDRATE 100 MG: 50 INJECTION, SOLUTION INTRAVENOUS at 10:03

## 2023-03-22 RX ADMIN — SODIUM CHLORIDE: 9 INJECTION, SOLUTION INTRAVENOUS at 06:03

## 2023-03-22 RX ADMIN — DIPHENHYDRAMINE HYDROCHLORIDE 25 MG: 25 CAPSULE ORAL at 11:03

## 2023-03-22 RX ADMIN — DEXTROSE MONOHYDRATE 1 G: 5 INJECTION INTRAVENOUS at 11:03

## 2023-03-22 RX ADMIN — DIPHENHYDRAMINE HYDROCHLORIDE 25 MG: 25 CAPSULE ORAL at 03:03

## 2023-03-22 RX ADMIN — SODIUM CHLORIDE: 9 INJECTION, SOLUTION INTRAVENOUS at 02:03

## 2023-03-22 RX ADMIN — DEXTROSE MONOHYDRATE 100 MG: 50 INJECTION, SOLUTION INTRAVENOUS at 12:03

## 2023-03-22 RX ADMIN — METRONIDAZOLE 500 MG: 500 INJECTION, SOLUTION INTRAVENOUS at 11:03

## 2023-03-22 RX ADMIN — DEXTROSE MONOHYDRATE 100 MG: 50 INJECTION, SOLUTION INTRAVENOUS at 11:03

## 2023-03-23 VITALS
WEIGHT: 128 LBS | TEMPERATURE: 99 F | DIASTOLIC BLOOD PRESSURE: 85 MMHG | RESPIRATION RATE: 18 BRPM | OXYGEN SATURATION: 98 % | SYSTOLIC BLOOD PRESSURE: 138 MMHG | HEART RATE: 85 BPM | BODY MASS INDEX: 23.55 KG/M2 | HEIGHT: 62 IN

## 2023-03-23 LAB
BACTERIA SPEC BFLD CULT: NORMAL
BASOPHILS # BLD AUTO: 0.03 K/UL (ref 0–0.2)
BASOPHILS NFR BLD AUTO: 0.5 % (ref 0–1)
DIFFERENTIAL METHOD BLD: ABNORMAL
EOSINOPHIL # BLD AUTO: 0.07 K/UL (ref 0–0.5)
EOSINOPHIL NFR BLD AUTO: 1.2 % (ref 1–4)
ERYTHROCYTE [DISTWIDTH] IN BLOOD BY AUTOMATED COUNT: 10.7 % (ref 11.5–14.5)
HCT VFR BLD AUTO: 28.5 % (ref 38–47)
HGB BLD-MCNC: 10 G/DL (ref 12–16)
IMM GRANULOCYTES # BLD AUTO: 0.02 K/UL (ref 0–0.04)
IMM GRANULOCYTES NFR BLD: 0.3 % (ref 0–0.4)
LYMPHOCYTES # BLD AUTO: 2 K/UL (ref 1–4.8)
LYMPHOCYTES NFR BLD AUTO: 33.4 % (ref 27–41)
MCH RBC QN AUTO: 31.7 PG (ref 27–31)
MCHC RBC AUTO-ENTMCNC: 35.1 G/DL (ref 32–36)
MCV RBC AUTO: 90.5 FL (ref 80–96)
MONOCYTES # BLD AUTO: 0.76 K/UL (ref 0–0.8)
MONOCYTES NFR BLD AUTO: 12.7 % (ref 2–6)
MPC BLD CALC-MCNC: 9.3 FL (ref 9.4–12.4)
NEUTROPHILS # BLD AUTO: 3.1 K/UL (ref 1.8–7.7)
NEUTROPHILS NFR BLD AUTO: 51.9 % (ref 53–65)
NRBC # BLD AUTO: 0 X10E3/UL
NRBC, AUTO (.00): 0 %
PLATELET # BLD AUTO: 295 K/UL (ref 150–400)
RBC # BLD AUTO: 3.15 M/UL (ref 4.2–5.4)
WBC # BLD AUTO: 5.98 K/UL (ref 4.5–11)

## 2023-03-23 PROCEDURE — 25000003 PHARM REV CODE 250: Performed by: OBSTETRICS & GYNECOLOGY

## 2023-03-23 PROCEDURE — 63600175 PHARM REV CODE 636 W HCPCS: Mod: JZ,JG | Performed by: OBSTETRICS & GYNECOLOGY

## 2023-03-23 PROCEDURE — 85025 COMPLETE CBC W/AUTO DIFF WBC: CPT | Performed by: OBSTETRICS & GYNECOLOGY

## 2023-03-23 PROCEDURE — 63600175 PHARM REV CODE 636 W HCPCS: Performed by: OBSTETRICS & GYNECOLOGY

## 2023-03-23 PROCEDURE — S0030 INJECTION, METRONIDAZOLE: HCPCS | Performed by: OBSTETRICS & GYNECOLOGY

## 2023-03-23 RX ORDER — DOXYCYCLINE 100 MG/1
100 CAPSULE ORAL 2 TIMES DAILY
Qty: 28 CAPSULE | Refills: 0 | Status: SHIPPED | OUTPATIENT
Start: 2023-03-23 | End: 2023-04-06

## 2023-03-23 RX ORDER — METRONIDAZOLE 500 MG/1
500 TABLET ORAL EVERY 12 HOURS
Qty: 28 TABLET | Refills: 0 | Status: SHIPPED | OUTPATIENT
Start: 2023-03-23 | End: 2023-04-06

## 2023-03-23 RX ORDER — HYDROCODONE BITARTRATE AND ACETAMINOPHEN 5; 325 MG/1; MG/1
1 TABLET ORAL EVERY 6 HOURS PRN
Qty: 15 TABLET | Refills: 0 | Status: SHIPPED | OUTPATIENT
Start: 2023-03-23 | End: 2023-03-23 | Stop reason: SDUPTHER

## 2023-03-23 RX ORDER — IBUPROFEN 800 MG/1
800 TABLET ORAL EVERY 8 HOURS PRN
Qty: 30 TABLET | Refills: 0 | Status: SHIPPED | OUTPATIENT
Start: 2023-03-23 | End: 2024-01-17 | Stop reason: ALTCHOICE

## 2023-03-23 RX ORDER — HYDROCODONE BITARTRATE AND ACETAMINOPHEN 5; 325 MG/1; MG/1
1 TABLET ORAL EVERY 6 HOURS PRN
Qty: 15 TABLET | Refills: 0 | Status: SHIPPED | OUTPATIENT
Start: 2023-03-23 | End: 2024-01-17 | Stop reason: ALTCHOICE

## 2023-03-23 RX ADMIN — DIPHENHYDRAMINE HYDROCHLORIDE 25 MG: 25 CAPSULE ORAL at 04:03

## 2023-03-23 RX ADMIN — POTASSIUM CHLORIDE 20 MEQ: 1500 TABLET, EXTENDED RELEASE ORAL at 09:03

## 2023-03-23 RX ADMIN — PENICILLIN G BENZATHINE 2.4 MILLION UNITS: 2400000 INJECTION, SUSPENSION INTRAMUSCULAR at 10:03

## 2023-03-23 RX ADMIN — METRONIDAZOLE 500 MG: 500 INJECTION, SOLUTION INTRAVENOUS at 04:03

## 2023-03-23 RX ADMIN — LIDOCAINE HYDROCHLORIDE 1 G: 10 INJECTION, SOLUTION INFILTRATION; PERINEURAL at 09:03

## 2023-03-23 NOTE — PROGRESS NOTES
HD#4    Patient feeling well, tolerating p.o., voiding without difficulty and passing flatus    Vital signs are stable the patient is afebrile greater than 24 hours    General alert and oriented x3 no acute distress  Abdomen is soft with decreased tenderness and no rebound or guarding  Extremities-no clubbing cyanosis or edema of lower extremities  No vaginal bleeding    WBC-5.9    Assessment and plan)  1-bilateral TOA - patient responding well to antibiotic therapy; status post drain attempt by Interventional Radiology; patient currently with normal white count and has been afebrile greater than 24 hour - will discharge home

## 2023-03-23 NOTE — ASSESSMENT & PLAN NOTE
Admit to inpatient    IV antibiotics  Rocephin  Flagyl  Doxycycline    IV Fluids (NS since LR is not compatible with Rocephin)      3/20/23 continue above antibiotics.  Pain management  Ibuprofen  Tylenol  Dilaudid    Anti-emetics    A urine specimen from today was sent for GC/CT PCR but patient received Rocephin yesterday and today.  I called the lab and they have a pretreatment specimen from 3/18 and will run off of that specimen as well since the first is in process.    3/20/23 continue above antibiotics.    3/22/23 patient continues to be  afebrile with a normal white count..  She is refusing further IV antibiotic treatment at this time.  An attempt by IR to place a drainage catheter was unsuccessful although they did aspirate some fluid for culture.  Thus far the culture has been no growth.  Patient should be ready for discharge home in the morning.

## 2023-03-23 NOTE — DISCHARGE INSTRUCTIONS
Pelvic Rest x 6 weeks: No tub baths, no tampons or douches, No sexual intercourse.  Notify Dr [facility] for any bleeding, increase pain, abdominal pain, or any other problems.      *Notify your healthcare provider if you experience any of the following: temperature >100.4  * Notify your healthcare provider if you experience any of the following: redness, tenderness.    *Notify your healthcare provider if you experience any of the following: difficulty breathing or     increased cough.  *Notify your physician if you experience any persistent nausea, vomiting, or diarrhea or headache  *Notify your physician if you experience any of the following:severe uncontrolled pain;worsening rash, increased confusion or weakness; dizziness, lightheadedness or visual disturbances

## 2023-03-23 NOTE — DISCHARGE SUMMARY
Discharge summary      Date of admit-March 20, 2023    Date of discharge-March 31, 2023    Admit diagnosis-bilateral tubo-ovarian abscesses    Discharge diagnosis-same; positive RPR    Procedures performed-drainage by Interventional Radiology on March 21, 2023    History and physical-see admit H&P    Hospital course-patient is a 23-year-old G0 who presented with bilateral tubo-ovarian abscesses.  Patient was admitted and placed on Rocephin, doxycycline, and Flagyl.  Patient's white blood cell count on presentation was 21.8 and she was experiencing low-grade fevers.  Interventional radiology provided drainage on March 21, 2023.  By hospital day 4, patient's white blood cell count had normalized to 5.9, and the patient had been afebrile greater than 24 hours and was feeling much improved and it was decided the patient was stable for discharge to home.  Patient had a positive RPR (titer 1:2) and it was decided to treat the patient with an IM dose of benzathine penicillin 2.4 million units before discharge.    Patient was discharged in stable condition  Patient was discharged on a regular diet  Patient was discharged with following medications-Motrin 800 mg p.o. q.8 hours p.r.n., [place] 5/3251 p.o. q.6 hours p.r.n., doxycycline 100 mg p.o. b.i.d. times 14 days; Flagyl 500 mg p.o. b.i.d. times 14 days  Patient was to follow-up with Dr. Almazan (per patient request) within 2 weeks  Patient instructed to observe pelvic rest for 4-6 weeks  Patient instructed to return to the ER for any acute problems

## 2023-03-23 NOTE — PROGRESS NOTES
[facility]  Obstetrics & Gynecology  Progress Note    Patient Name: Kaovn Denise  MRN: [id number]  Admission Date: 3/19/2023  Primary Care Provider: Primary Doctor No  Principal Problem: Tubo-ovarian abscess    Subjective:     HPI:    3/19/23  Unassigned patient - Has seen Erica Luke CNM and Keya Ruff CNM in clinic in 2021.    Patient is a very pleasant 22yo G0, LMP 3/5-3/9, not on contraception that presents to the ER within 24hr of previous with new-onset abdominal pain    Patient presented to the ER at [facility] on 3/18 with N/V and body aches. After evaluation it was felt she had a UTI and was given a dose of Rocephin and Dcd with a Bactrim Rx. Her abdomen was nontender.    The patient returned today with complaint of abdominal tenderness. Her CBC noted an increased WBC to 21.8 from 18.0.  A CT scan was obtained that was notable for likely TOA - bilateral.    CT ABDOMEN PELVIS WITH CONTRAST     CLINICAL HISTORY:  Abdominal abscess/infection suspected;     COMPARISON:  CT abdomen pelvis March 29, 2019     TECHNIQUE:  Multiple axial tomographic images of the abdomen and pelvis were obtained after administration of 100 cc Isovue 370 intravenous contrast.     FINDINGS:  Mild dependent changes of the lungs noted.     Subcentimeter hypodensity demonstrated within the right lobe of the liver which is too small to characterize but may reflect a cyst.  Visualized gallbladder grossly unremarkable.  Visualized pancreas appears unremarkable.  Spleen grossly unremarkable.     Bilateral adrenal glands grossly unremarkable.  Bilateral kidneys appear grossly unremarkable.  Urinary bladder incompletely distended.  There is a complex septated cystic mass within the dorsal left pelvis measuring up to 6.8 x 5.0 cm in axial dimension with differential including tubo-ovarian abscess.  There is a complex septated cystic mass within the ventral right pelvis which measures up to 6.0 by 4.3 cm in axial  dimension with differential including tubo-ovarian abscess.     No evidence of gastrointestinal obstruction.  The appendix is not identified with certainty.  Vasculature grossly unremarkable.  Visualized osseous and surrounding soft tissue structures demonstrate no acute abnormality.     Impression:     There is a complex septated cystic mass within the dorsal left pelvis measuring up to 6.8 x 5.0 cm in axial dimension with differential including tubo-ovarian abscess.  There is a complex septated cystic mass within the ventral right pelvis which measures up to 6.0 by 4.3 cm in axial dimension with differential including tubo-ovarian abscess.     The CT exam was performed using one or more of the following dose     reduction techniques- Automated exposure control, adjustment of the mA     and/or kV according to patient size, and/or use of iterative     reconstructed technique.     Point of Service: [facility]        Electronically signed by: Dread Warren  Date:                                            03/19/2023  Time:                                           11:30      The patient states that her pain is lower abdominal, mostly midline and does not radiate. Aggravating factors are movement, palpation. No alleviating factors (did respond with some improvement after 0.5mg Dilaudid in ER by ER team).    Deneis fever, chills.  Denies vaginal bleeding.  Denies abnormal vaginal discharge or odor.    Patient does have unprotected intercourse (would like to have a pregnancy and has stated as much in the past.)  Patient is noted through her clinic notes to have Hx of high risk sexual activity.  Her labs are positive for History of Syphilis, HSV 2 (though she denies any vulvovaginal or oral outbreaks).    She denies anal intercourse.    A urine specimen from today was sent for GC/CT PCR but patient received Rocephin yesterday and today.  I called the lab and they have a pretreatment specimen from 3/18 and  will run off of that specimen as well since the first is in process.      Interval History: HD#3  Patient reports decreased discomfort but has had occasional nausea and 1 episode of vomiting today.  She stated later in the afternoon that she was not going to continue receiving IV antibiotics as she is convinced that the antibiotics are causing her nausea.  She is quite adamant in this and is not open to discussion.  She reports that she is having a normal bowel movement daily.  Her Flagyl and doxycycline will be given p.o. and her Rocephin will be given IM tonight.  She continues to be afebrile with a normal white count now.  An attempt me was made yesterday to place a drain in a collection of fluid but this was unsuccessful.  They did aspirate approximately 15 mL of yellow fluid which so far has shown no growth.  She understands she will need to continue the antibiotics after discharge.    Scheduled Meds:     cefTRIAXone (ROCEPHIN) IVPB  1 g Intravenous Q24H    diphenhydrAMINE  25 mg Oral Q4H    doxycycline (VIBRAMYCIN) IVPB  100 mg Intravenous Q12H    metronidazole  500 mg Intravenous Q8H     Continuous Infusions:   sodium chloride 0.9% 150 mL/hr at 03/21/23 0832     PRN Meds:acetaminophen, diphenhydrAMINE, HYDROmorphone, ibuprofen, morphine, ondansetron, prochlorperazine, sodium chloride 0.9%    Review of patient's allergies indicates:  No Known Allergies    Objective:     Vital Signs (Most Recent):  Temp: 99 °F (37.2 °C) (03/21/23 0630)  Pulse: 79 (03/21/23 1106)  Resp: 15 (03/21/23 1106)  BP: 118/76 (03/21/23 1106)  SpO2: 100 % (03/21/23 1106) Vital Signs (24h Range):  Temp:  [98.1 °F (36.7 °C)-101.1 °F (38.4 °C)] 99 °F (37.2 °C)  Pulse:  [] 79  Resp:  [15-20] 15  SpO2:  [97 %-100 %] 100 %  BP: (106-140)/(71-93) 118/76     Weight: 58.1 kg (128 lb)  Body mass index is 23.41 kg/m².  Patient's last menstrual period was 03/09/2023 (approximate).    I&O (Last 24H):  No intake or output data in the 24  hours ending 03/21/23 1149      Laboratory:  CBC:   Recent Labs   Lab 03/22/23  0332   WBC 7.04   RBC 3.31*   HGB 10.5*   HCT 30.4*      MCV 91.8   MCH 31.7*   MCHC 34.5     Recent Lab Results         03/22/23  0332        Anion Gap 12       Baso # 0.02       Basophil % 0.3       BUN 3       BUN/CREAT RATIO 4       Calcium 8.5       Chloride 104       CO2 26       Creatinine 0.67       Differential Type Auto       eGFR 125       Eos # 0.02       Eosinophil % 0.3       Glucose 88       Hematocrit 30.4       Hemoglobin 10.5       Immature Grans (Abs) 0.03       Immature Granulocytes 0.4       Lymph # 1.64       Lymph % 23.3       MCH 31.7       MCHC 34.5       MCV 91.8       Mono # 1.03       Mono % 14.6       MPV 8.9       Neutrophils, Abs 4.30       Neutrophils Relative 61.1       nRBC 0.0       NUCLEATED RBC ABSOLUTE 0.00       Platelets 284       Potassium 3.3       RBC 3.31       RDW 10.6       Sodium 139       WBC 7.04             I have personallly reviewed all pertinent lab results from the last 24 hours.    Diagnostic Results:  CT: Reviewed      Physical Exam:   Constitutional: She appears well-developed and well-nourished. No distress.       Cardiovascular:  Normal rate and regular rhythm.             Pulmonary/Chest: Effort normal. No respiratory distress.        Abdominal: Soft. She exhibits no distension. There is no abdominal tenderness.             Musculoskeletal: Moves all extremeties.       Neurological: She is alert.    Skin: Skin is warm.      Review of Systems   Constitutional:  Negative for appetite change, chills and fever.   HENT: Negative.     Eyes: Negative.    Respiratory:  Negative for cough and shortness of breath.    Gastrointestinal:  Positive for nausea and vomiting. Negative for abdominal pain, constipation and diarrhea.   Musculoskeletal:  Negative for back pain.       Assessment/Plan:     * Tubo-ovarian abscess    Admit to inpatient    IV  antibiotics  Rocephin  Flagyl  Doxycycline    IV Fluids (NS since LR is not compatible with Rocephin)      3/20/23 continue above antibiotics.  Pain management  Ibuprofen  Tylenol  Dilaudid    Anti-emetics    A urine specimen from today was sent for GC/CT PCR but patient received Rocephin yesterday and today.  I called the lab and they have a pretreatment specimen from 3/18 and will run off of that specimen as well since the first is in process.    3/20/23 continue above antibiotics.    3/22/23 patient continues to be  afebrile with a normal white count..  She is refusing further IV antibiotic treatment at this time.  An attempt by IR to place a drainage catheter was unsuccessful although they did aspirate some fluid for culture.  Thus far the culture has been no growth.  Patient should be ready for discharge home in the morning.      Positive serology for syphilis    Confirmatory testing being done    Patient has Hx of Syphilis  RPR titer now 1:2 from 1:8 eight months ago. She does not require treatment for syphilis at this time.         Tal Baeza MD  Obstetrics & Gynecology  [facility]

## 2023-03-27 ENCOUNTER — TELEPHONE (OUTPATIENT)
Dept: ORTHOPEDICS | Facility: HOSPITAL | Age: 23
End: 2023-03-27
Payer: MEDICAID

## 2023-03-27 LAB
BACTERIA BLD CULT: NORMAL
BACTERIA BLD CULT: NORMAL

## 2023-03-27 NOTE — TELEPHONE ENCOUNTER
Patient states no drainage, no redness, no swelling at surgery site,. Patient states doing well and will keep follow up appt as scheduled

## 2023-07-19 ENCOUNTER — OFFICE VISIT (OUTPATIENT)
Dept: FAMILY MEDICINE | Facility: CLINIC | Age: 23
End: 2023-07-19
Payer: MEDICAID

## 2023-07-19 VITALS
DIASTOLIC BLOOD PRESSURE: 81 MMHG | SYSTOLIC BLOOD PRESSURE: 146 MMHG | OXYGEN SATURATION: 99 % | HEART RATE: 88 BPM | HEIGHT: 62 IN | RESPIRATION RATE: 20 BRPM | TEMPERATURE: 98 F | BODY MASS INDEX: 22.82 KG/M2 | WEIGHT: 124 LBS

## 2023-07-19 DIAGNOSIS — R30.0 DYSURIA: ICD-10-CM

## 2023-07-19 DIAGNOSIS — N30.01 ACUTE CYSTITIS WITH HEMATURIA: Primary | ICD-10-CM

## 2023-07-19 LAB
BILIRUB SERPL-MCNC: ABNORMAL MG/DL
BLOOD URINE, POC: ABNORMAL
COLOR, POC UA: ABNORMAL
GLUCOSE UR QL STRIP: NEGATIVE
KETONES UR QL STRIP: ABNORMAL
LEUKOCYTE ESTERASE URINE, POC: ABNORMAL
NITRITE, POC UA: NEGATIVE
PH, POC UA: 5.5
PROTEIN, POC: ABNORMAL
SPECIFIC GRAVITY, POC UA: >=1.03
UROBILINOGEN, POC UA: 1

## 2023-07-19 PROCEDURE — 3008F BODY MASS INDEX DOCD: CPT | Mod: CPTII,,, | Performed by: FAMILY MEDICINE

## 2023-07-19 PROCEDURE — 99214 OFFICE O/P EST MOD 30 MIN: CPT | Mod: 25,,, | Performed by: FAMILY MEDICINE

## 2023-07-19 PROCEDURE — 3008F PR BODY MASS INDEX (BMI) DOCUMENTED: ICD-10-PCS | Mod: CPTII,,, | Performed by: FAMILY MEDICINE

## 2023-07-19 PROCEDURE — 96372 THER/PROPH/DIAG INJ SC/IM: CPT | Mod: ,,, | Performed by: FAMILY MEDICINE

## 2023-07-19 PROCEDURE — 1159F MED LIST DOCD IN RCRD: CPT | Mod: CPTII,,, | Performed by: FAMILY MEDICINE

## 2023-07-19 PROCEDURE — 81003 URINALYSIS AUTO W/O SCOPE: CPT | Mod: QW,RHCUB | Performed by: FAMILY MEDICINE

## 2023-07-19 PROCEDURE — 1159F PR MEDICATION LIST DOCUMENTED IN MEDICAL RECORD: ICD-10-PCS | Mod: CPTII,,, | Performed by: FAMILY MEDICINE

## 2023-07-19 PROCEDURE — 96372 PR INJECTION,THERAP/PROPH/DIAG2ST, IM OR SUBCUT: ICD-10-PCS | Mod: ,,, | Performed by: FAMILY MEDICINE

## 2023-07-19 PROCEDURE — 99051 MED SERV EVE/WKEND/HOLIDAY: CPT | Mod: ,,, | Performed by: FAMILY MEDICINE

## 2023-07-19 PROCEDURE — 99214 PR OFFICE/OUTPT VISIT, EST, LEVL IV, 30-39 MIN: ICD-10-PCS | Mod: 25,,, | Performed by: FAMILY MEDICINE

## 2023-07-19 PROCEDURE — 3077F SYST BP >= 140 MM HG: CPT | Mod: CPTII,,, | Performed by: FAMILY MEDICINE

## 2023-07-19 PROCEDURE — 1160F PR REVIEW ALL MEDS BY PRESCRIBER/CLIN PHARMACIST DOCUMENTED: ICD-10-PCS | Mod: CPTII,,, | Performed by: FAMILY MEDICINE

## 2023-07-19 PROCEDURE — 3079F DIAST BP 80-89 MM HG: CPT | Mod: CPTII,,, | Performed by: FAMILY MEDICINE

## 2023-07-19 PROCEDURE — 3077F PR MOST RECENT SYSTOLIC BLOOD PRESSURE >= 140 MM HG: ICD-10-PCS | Mod: CPTII,,, | Performed by: FAMILY MEDICINE

## 2023-07-19 PROCEDURE — 3079F PR MOST RECENT DIASTOLIC BLOOD PRESSURE 80-89 MM HG: ICD-10-PCS | Mod: CPTII,,, | Performed by: FAMILY MEDICINE

## 2023-07-19 PROCEDURE — 99051 PR MEDICAL SERVICES, EVE/WKEND/HOLIDAY: ICD-10-PCS | Mod: ,,, | Performed by: FAMILY MEDICINE

## 2023-07-19 PROCEDURE — 1160F RVW MEDS BY RX/DR IN RCRD: CPT | Mod: CPTII,,, | Performed by: FAMILY MEDICINE

## 2023-07-19 RX ORDER — CIPROFLOXACIN 500 MG/1
500 TABLET ORAL EVERY 12 HOURS
Qty: 14 TABLET | Refills: 0 | Status: SHIPPED | OUTPATIENT
Start: 2023-07-19 | End: 2023-07-26

## 2023-07-19 RX ORDER — CEFTRIAXONE 1 G/1
1 INJECTION, POWDER, FOR SOLUTION INTRAMUSCULAR; INTRAVENOUS
Status: COMPLETED | OUTPATIENT
Start: 2023-07-19 | End: 2023-07-19

## 2023-07-19 RX ADMIN — CEFTRIAXONE 1 G: 1 INJECTION, POWDER, FOR SOLUTION INTRAMUSCULAR; INTRAVENOUS at 07:07

## 2023-07-20 NOTE — PROGRESS NOTES
Subjective:       Patient ID: Kavon Denise is a 23 y.o. female.    Chief Complaint: Dysuria (Dysuria with hematuria)    HPI  Review of Systems   Constitutional:  Negative for activity change, appetite change, chills, diaphoresis, fatigue, fever and unexpected weight change.   HENT:  Negative for nasal congestion, dental problem, drooling, ear discharge, ear pain, facial swelling, hearing loss, mouth sores, nosebleeds, postnasal drip, rhinorrhea, sinus pressure/congestion, sneezing, sore throat, tinnitus, trouble swallowing, voice change and goiter.    Eyes:  Negative for photophobia, discharge, itching and visual disturbance.   Respiratory:  Negative for apnea, cough, choking, chest tightness, shortness of breath, wheezing and stridor.    Cardiovascular:  Negative for chest pain, palpitations, leg swelling and claudication.   Gastrointestinal:  Negative for abdominal distention, abdominal pain, anal bleeding, blood in stool, change in bowel habit, constipation, diarrhea, nausea, vomiting and change in bowel habit.   Endocrine: Negative for cold intolerance, heat intolerance, polydipsia, polyphagia and polyuria.   Genitourinary:  Positive for dysuria, frequency and hematuria. Negative for bladder incontinence, decreased urine volume, difficulty urinating, enuresis, flank pain, nocturia, pelvic pain and urgency.   Musculoskeletal:  Negative for arthralgias, back pain, gait problem, joint swelling, leg pain, myalgias, neck pain, neck stiffness and joint deformity.   Integumentary:  Negative for pallor, rash, wound, breast mass and breast tenderness.   Allergic/Immunologic: Negative for environmental allergies, food allergies and immunocompromised state.   Neurological:  Negative for dizziness, vertigo, tremors, seizures, syncope, facial asymmetry, speech difficulty, weakness, light-headedness, numbness, headaches, coordination difficulties, memory loss and coordination difficulties.   Hematological:  Negative for  adenopathy. Does not bruise/bleed easily.   Psychiatric/Behavioral:  Negative for agitation, behavioral problems, confusion, decreased concentration, dysphoric mood, hallucinations, self-injury, sleep disturbance and suicidal ideas. The patient is not nervous/anxious and is not hyperactive.    Breast: Negative for mass and tenderness      Objective:      Physical Exam  Vitals reviewed.   Constitutional:       Appearance: Normal appearance.   HENT:      Head: Normocephalic and atraumatic.      Right Ear: Tympanic membrane, ear canal and external ear normal.      Left Ear: Tympanic membrane, ear canal and external ear normal.      Nose: Nose normal.      Mouth/Throat:      Mouth: Mucous membranes are moist.      Pharynx: Oropharynx is clear.   Eyes:      Extraocular Movements: Extraocular movements intact.      Conjunctiva/sclera: Conjunctivae normal.      Pupils: Pupils are equal, round, and reactive to light.   Cardiovascular:      Rate and Rhythm: Normal rate and regular rhythm.      Pulses: Normal pulses.      Heart sounds: Normal heart sounds.   Pulmonary:      Effort: Pulmonary effort is normal.      Breath sounds: Normal breath sounds.   Abdominal:      General: Bowel sounds are normal.      Palpations: Abdomen is soft.   Musculoskeletal:         General: Normal range of motion.      Cervical back: Normal range of motion and neck supple.   Skin:     General: Skin is warm and dry.   Neurological:      General: No focal deficit present.      Mental Status: She is alert. Mental status is at baseline.   Psychiatric:         Mood and Affect: Mood normal.         Behavior: Behavior normal.         Thought Content: Thought content normal.         Judgment: Judgment normal.       Assessment:       1. Acute cystitis with hematuria    2. Dysuria        Plan:     Acute cystitis with hematuria  -     cefTRIAXone injection 1 g  -     ciprofloxacin HCl (CIPRO) 500 MG tablet; Take 1 tablet (500 mg total) by mouth every 12  (twelve) hours. for 7 days  Dispense: 14 tablet; Refill: 0    Dysuria  -     POCT URINALYSIS W/O SCOPE

## 2024-01-17 ENCOUNTER — OFFICE VISIT (OUTPATIENT)
Dept: FAMILY MEDICINE | Facility: CLINIC | Age: 24
End: 2024-01-17
Payer: MEDICAID

## 2024-01-17 VITALS
SYSTOLIC BLOOD PRESSURE: 125 MMHG | HEIGHT: 62 IN | RESPIRATION RATE: 18 BRPM | HEART RATE: 85 BPM | WEIGHT: 132 LBS | BODY MASS INDEX: 24.29 KG/M2 | DIASTOLIC BLOOD PRESSURE: 83 MMHG | TEMPERATURE: 99 F | OXYGEN SATURATION: 98 %

## 2024-01-17 DIAGNOSIS — R30.0 DYSURIA: ICD-10-CM

## 2024-01-17 DIAGNOSIS — Z11.3 ENCOUNTER FOR SCREENING FOR INFECTIONS WITH PREDOMINANTLY SEXUAL MODE OF TRANSMISSION: ICD-10-CM

## 2024-01-17 DIAGNOSIS — Z30.013 ENCOUNTER FOR INITIAL PRESCRIPTION OF INJECTABLE CONTRACEPTIVE: Primary | ICD-10-CM

## 2024-01-17 DIAGNOSIS — Z32.00 POSSIBLE PREGNANCY: ICD-10-CM

## 2024-01-17 DIAGNOSIS — Z72.51 HIGH RISK HETEROSEXUAL BEHAVIOR: ICD-10-CM

## 2024-01-17 LAB
B-HCG UR QL: NEGATIVE
BILIRUB SERPL-MCNC: NORMAL MG/DL
BLOOD URINE, POC: NORMAL
CANDIDA SPECIES: NEGATIVE
COLOR, POC UA: YELLOW
CTP QC/QA: YES
GARDNERELLA: POSITIVE
GLUCOSE UR QL STRIP: NORMAL
HIV 1+O+2 AB SERPL QL: NORMAL
KETONES UR QL STRIP: NORMAL
LEUKOCYTE ESTERASE URINE, POC: NORMAL
NITRITE, POC UA: NORMAL
PH, POC UA: 7.5
PROTEIN, POC: NORMAL
SPECIFIC GRAVITY, POC UA: 1.02
SYPHILIS AB INTERPRETATION: REACTIVE
TRICHOMONAS: NEGATIVE
UROBILINOGEN, POC UA: 0.2

## 2024-01-17 PROCEDURE — 86694 HERPES SIMPLEX NES ANTBDY: CPT | Mod: ,,, | Performed by: CLINICAL MEDICAL LABORATORY

## 2024-01-17 PROCEDURE — 87491 CHLMYD TRACH DNA AMP PROBE: CPT | Mod: ,,, | Performed by: CLINICAL MEDICAL LABORATORY

## 2024-01-17 PROCEDURE — 87591 N.GONORRHOEAE DNA AMP PROB: CPT | Mod: ,,, | Performed by: CLINICAL MEDICAL LABORATORY

## 2024-01-17 PROCEDURE — 86592 SYPHILIS TEST NON-TREP QUAL: CPT | Mod: ,,, | Performed by: CLINICAL MEDICAL LABORATORY

## 2024-01-17 PROCEDURE — 81003 URINALYSIS AUTO W/O SCOPE: CPT | Mod: QW,,, | Performed by: ADVANCED PRACTICE MIDWIFE

## 2024-01-17 PROCEDURE — 87660 TRICHOMONAS VAGIN DIR PROBE: CPT | Mod: ,,, | Performed by: CLINICAL MEDICAL LABORATORY

## 2024-01-17 PROCEDURE — 3008F BODY MASS INDEX DOCD: CPT | Mod: CPTII,,, | Performed by: ADVANCED PRACTICE MIDWIFE

## 2024-01-17 PROCEDURE — 87480 CANDIDA DNA DIR PROBE: CPT | Mod: ,,, | Performed by: CLINICAL MEDICAL LABORATORY

## 2024-01-17 PROCEDURE — 3074F SYST BP LT 130 MM HG: CPT | Mod: CPTII,,, | Performed by: ADVANCED PRACTICE MIDWIFE

## 2024-01-17 PROCEDURE — 86695 HERPES SIMPLEX TYPE 1 TEST: CPT | Mod: ,,, | Performed by: CLINICAL MEDICAL LABORATORY

## 2024-01-17 PROCEDURE — 81025 URINE PREGNANCY TEST: CPT | Mod: QW,,, | Performed by: ADVANCED PRACTICE MIDWIFE

## 2024-01-17 PROCEDURE — 87389 HIV-1 AG W/HIV-1&-2 AB AG IA: CPT | Mod: ,,, | Performed by: CLINICAL MEDICAL LABORATORY

## 2024-01-17 PROCEDURE — 3079F DIAST BP 80-89 MM HG: CPT | Mod: CPTII,,, | Performed by: ADVANCED PRACTICE MIDWIFE

## 2024-01-17 PROCEDURE — 87510 GARDNER VAG DNA DIR PROBE: CPT | Mod: ,,, | Performed by: CLINICAL MEDICAL LABORATORY

## 2024-01-17 PROCEDURE — 1159F MED LIST DOCD IN RCRD: CPT | Mod: CPTII,,, | Performed by: ADVANCED PRACTICE MIDWIFE

## 2024-01-17 PROCEDURE — 86780 TREPONEMA PALLIDUM: CPT | Mod: ,,, | Performed by: CLINICAL MEDICAL LABORATORY

## 2024-01-17 PROCEDURE — 86696 HERPES SIMPLEX TYPE 2 TEST: CPT | Mod: ,,, | Performed by: CLINICAL MEDICAL LABORATORY

## 2024-01-17 PROCEDURE — 99213 OFFICE O/P EST LOW 20 MIN: CPT | Mod: ,,, | Performed by: ADVANCED PRACTICE MIDWIFE

## 2024-01-17 NOTE — PROGRESS NOTES
Subjective:      Patient ID: Kavon Denise is a 23 y.o. female.    Chief Complaint:  Wants to start birth control and get tested for infections.    History of Present Illness  Ms Denise wants to start birth control and get tested for STI's. She has taken Depo up until two years ago and she would like to take it again.  She had been trying to get pregnant but hasn't.  She says her partner just got treated for some infection and received a shot.  He would not tell her what it was.  She says he is a ladys' man.  I recommended she not have sex with him or at least use a condom.    Annual Exam-Premenopausal  Patient presents for birth control and STI testing.  The patient has no complaints today. The patient is sexually active. GYN screening history: last pap: approximate date 2021 and was normal. The patient wears seatbelts: yes. The patient participates in regular exercise: no. Has the patient ever been transfused or tattooed?: yes. The patient reports that there is not domestic violence in her life.    GYN & OB History  Patient's last menstrual period was 01/13/2024.   Date of Last Pap: No result found 2021    OB History   No obstetric history on file.       Review of Systems  Review of Systems   Constitutional: Negative.    HENT: Negative.     Respiratory: Negative.     Cardiovascular: Negative.    Gastrointestinal: Negative.    Genitourinary: Negative.    Musculoskeletal: Negative.    Psychiatric/Behavioral: Negative.     Breast: negative.           Objective:     Physical Exam:   Constitutional: She is oriented to person, place, and time. She appears well-developed and well-nourished.    HENT:   Head: Normocephalic.      Cardiovascular:  Normal rate.             Pulmonary/Chest: Effort normal.        Abdominal: Soft.             Musculoskeletal: Moves all extremeties.       Neurological: She is alert and oriented to person, place, and time.    Skin: Skin is warm and dry.    Psychiatric: She has a normal mood  and affect. Her behavior is normal.         Assessment:     1. Encounter for initial prescription of injectable contraceptive    2. Dysuria    3. Possible pregnancy    4. High risk heterosexual behavior    5. Encounter for screening for infections with predominantly sexual mode of transmission           Plan:   Depo Provera given  ACHES reviewed, Use condoms  Call result of testing  Return for Depo in 3 months and needs annual exam in November.

## 2024-01-18 ENCOUNTER — PATIENT MESSAGE (OUTPATIENT)
Dept: OBSTETRICS AND GYNECOLOGY | Facility: CLINIC | Age: 24
End: 2024-01-18
Payer: MEDICAID

## 2024-01-18 DIAGNOSIS — B96.89 BACTERIAL VAGINOSIS: Primary | ICD-10-CM

## 2024-01-18 DIAGNOSIS — Z30.013 ENCOUNTER FOR INITIAL PRESCRIPTION OF INJECTABLE CONTRACEPTIVE: Primary | ICD-10-CM

## 2024-01-18 DIAGNOSIS — N76.0 BACTERIAL VAGINOSIS: Primary | ICD-10-CM

## 2024-01-18 LAB
CHLAMYDIA BY PCR: NEGATIVE
HSV IGM SER QL IA: POSITIVE
HSV TYPE 1 AB IGG INDEX: 0.06
HSV TYPE 2 AB IGG INDEX: 6.06
HSV1 IGG SER QL: NEGATIVE
HSV2 IGG SER QL: POSITIVE
N. GONORRHOEAE (GC) BY PCR: POSITIVE
RPR SER-TITR: ABNORMAL {TITER}

## 2024-01-18 PROCEDURE — 96372 THER/PROPH/DIAG INJ SC/IM: CPT | Mod: ,,, | Performed by: ADVANCED PRACTICE MIDWIFE

## 2024-01-18 RX ORDER — MEDROXYPROGESTERONE ACETATE 150 MG/ML
150 INJECTION, SUSPENSION INTRAMUSCULAR
Status: COMPLETED | OUTPATIENT
Start: 2024-01-18 | End: 2024-01-18

## 2024-01-18 RX ORDER — METRONIDAZOLE 500 MG/1
500 TABLET ORAL 2 TIMES DAILY
Qty: 14 TABLET | Refills: 0 | Status: SHIPPED | OUTPATIENT
Start: 2024-01-18 | End: 2024-01-25

## 2024-01-18 RX ADMIN — MEDROXYPROGESTERONE ACETATE 150 MG: 150 INJECTION, SUSPENSION INTRAMUSCULAR at 09:01

## 2024-01-25 ENCOUNTER — TELEPHONE (OUTPATIENT)
Dept: OBSTETRICS AND GYNECOLOGY | Facility: CLINIC | Age: 24
End: 2024-01-25
Payer: MEDICAID

## 2024-01-25 NOTE — TELEPHONE ENCOUNTER
----- Message from Madelyn Melo sent at 1/25/2024  3:52 PM CST -----  Pt called requesting a call back    Call back # [phone]

## 2024-01-26 ENCOUNTER — OFFICE VISIT (OUTPATIENT)
Dept: OBSTETRICS AND GYNECOLOGY | Facility: CLINIC | Age: 24
End: 2024-01-26
Payer: MEDICAID

## 2024-01-26 VITALS
HEART RATE: 77 BPM | HEIGHT: 62 IN | OXYGEN SATURATION: 99 % | BODY MASS INDEX: 23.7 KG/M2 | WEIGHT: 128.81 LBS | SYSTOLIC BLOOD PRESSURE: 133 MMHG | DIASTOLIC BLOOD PRESSURE: 89 MMHG | RESPIRATION RATE: 17 BRPM

## 2024-01-26 DIAGNOSIS — A54.9 GONORRHEA: Primary | ICD-10-CM

## 2024-01-26 PROCEDURE — 96372 THER/PROPH/DIAG INJ SC/IM: CPT | Mod: ,,, | Performed by: ADVANCED PRACTICE MIDWIFE

## 2024-01-26 PROCEDURE — 3008F BODY MASS INDEX DOCD: CPT | Mod: CPTII,,, | Performed by: ADVANCED PRACTICE MIDWIFE

## 2024-01-26 PROCEDURE — 3079F DIAST BP 80-89 MM HG: CPT | Mod: CPTII,,, | Performed by: ADVANCED PRACTICE MIDWIFE

## 2024-01-26 PROCEDURE — 3075F SYST BP GE 130 - 139MM HG: CPT | Mod: CPTII,,, | Performed by: ADVANCED PRACTICE MIDWIFE

## 2024-01-26 PROCEDURE — 1159F MED LIST DOCD IN RCRD: CPT | Mod: CPTII,,, | Performed by: ADVANCED PRACTICE MIDWIFE

## 2024-01-26 PROCEDURE — 99213 OFFICE O/P EST LOW 20 MIN: CPT | Mod: 25,,, | Performed by: ADVANCED PRACTICE MIDWIFE

## 2024-01-26 RX ORDER — CEFTRIAXONE 500 MG/1
500 INJECTION, POWDER, FOR SOLUTION INTRAMUSCULAR; INTRAVENOUS ONCE
Status: COMPLETED | OUTPATIENT
Start: 2024-01-26 | End: 2024-01-26

## 2024-01-26 RX ADMIN — CEFTRIAXONE 500 MG: 500 INJECTION, POWDER, FOR SOLUTION INTRAMUSCULAR; INTRAVENOUS at 04:01

## 2024-01-27 NOTE — PROGRESS NOTES
"Patient ID:  Kavon Denise is a 24 y.o. female      Chief Complaint:   Chief Complaint   Patient presents with    Lab review and treatment     Treatment for positive gonorrhea        HPI:  Kavon is in for positive GC tx. Has seen results on My Chart. Rx for BV tx has been sent to pharmacy. Reviewed previous labs. Discussed Syphilis and need to repeat RPR in 3-4 months as it has increased from 1:2 to 1:4.Discussed partner notification of + GC so he can obtain treatment.   LMP: Patient's last menstrual period was 2024.   Sexually active:  yes  Contraceptive: depo injection      Past Medical History:   Diagnosis Date    Ovarian cyst      Past Surgical History:   Procedure Laterality Date    OVARIAN CYST REMOVAL Right        OB History          0    Para   0    Term   0       0    AB   0    Living   0         SAB   0    IAB   0    Ectopic   0    Multiple   0    Live Births   0                 /89 (BP Location: Right arm, Patient Position: Sitting)   Pulse 77   Resp 17   Ht 5' 2" (1.575 m)   Wt 58.4 kg (128 lb 12.8 oz)   LMP 2024   SpO2 99%   BMI 23.56 kg/m²   Wt Readings from Last 3 Encounters:   24 58.4 kg (128 lb 12.8 oz)   24 59.9 kg (132 lb)   23 56.2 kg (124 lb)          ROS:  Review of Systems   Constitutional: Negative.    Eyes: Negative.    Respiratory: Negative.     Cardiovascular: Negative.    Gastrointestinal: Negative.    Genitourinary: Negative.    Musculoskeletal: Negative.    Neurological: Negative.    Psychiatric/Behavioral: Negative.            PHYSICAL EXAM:  Physical Exam  Constitutional:       Appearance: Normal appearance. She is normal weight.   Eyes:      Extraocular Movements: Extraocular movements intact.   Cardiovascular:      Rate and Rhythm: Normal rate.      Pulses: Normal pulses.   Pulmonary:      Effort: Pulmonary effort is normal.   Musculoskeletal:         General: Normal range of motion.      Cervical back: Normal range of " motion.   Skin:     General: Skin is warm and dry.   Neurological:      Mental Status: She is alert and oriented to person, place, and time.   Psychiatric:         Mood and Affect: Mood normal.         Behavior: Behavior normal.         Thought Content: Thought content normal.         Judgment: Judgment normal.          Assessment:  Kavon was seen today for lab review and treatment.    Diagnoses and all orders for this visit:    Gonorrhea  -     cefTRIAXone injection 500 mg    BMI 23.0-23.9, adult          ICD-10-CM ICD-9-CM    1. Gonorrhea  A54.9 098.0 cefTRIAXone injection 500 mg      2. BMI 23.0-23.9, adult  Z68.23 V85.1           Plan:  Discussed GC as an STD, partner needs testing and treating  Rocephin 500 mg given IM  Encouraged to return in 3- 4 months for STD CHEVY & repeat RPR    Follow up in about 3 months (around 4/26/2024) for CHEVY.        Answers submitted by the patient for this visit:  Exposure to STD Questionnaire  (Submitted on 1/26/2024)  Chief Complaint: STD exposure  discharge: No  genital itching: No  genital rash: No  dyspareunia: No  genital lesions: No  pelvic pain: No  dysuria: No  Onset: in the past 7 days  Progression since onset: resolved  Please select the characteristics of your discharge: : normal  abdominal pain: No  anorexia: No  diaphoresis: No  fever: No  genital odor: Yes  rectal pain: No  sore throat: No  frequency: Yes  treatments tried: warm bath  risk factors: history of STDs

## 2024-02-25 ENCOUNTER — OFFICE VISIT (OUTPATIENT)
Dept: FAMILY MEDICINE | Facility: CLINIC | Age: 24
End: 2024-02-25

## 2024-02-25 VITALS
SYSTOLIC BLOOD PRESSURE: 148 MMHG | BODY MASS INDEX: 22.68 KG/M2 | HEART RATE: 73 BPM | DIASTOLIC BLOOD PRESSURE: 100 MMHG | OXYGEN SATURATION: 99 % | TEMPERATURE: 99 F | HEIGHT: 63 IN | RESPIRATION RATE: 18 BRPM | WEIGHT: 128 LBS

## 2024-02-25 DIAGNOSIS — R11.2 NAUSEA AND VOMITING, UNSPECIFIED VOMITING TYPE: ICD-10-CM

## 2024-02-25 DIAGNOSIS — B34.9 VIRAL ILLNESS: Primary | ICD-10-CM

## 2024-02-25 LAB
B-HCG UR QL: NEGATIVE
BILIRUB SERPL-MCNC: ABNORMAL MG/DL
BLOOD URINE, POC: ABNORMAL
COLOR, POC UA: YELLOW
CTP QC/QA: YES
FLUAV AG NPH QL: NEGATIVE
FLUBV AG NPH QL: NEGATIVE
GLUCOSE UR QL STRIP: NEGATIVE
KETONES UR QL STRIP: NEGATIVE
LEUKOCYTE ESTERASE URINE, POC: NEGATIVE
NITRITE, POC UA: NEGATIVE
PH, POC UA: 7
POC MOLECULAR INFLUENZA A AGN: NEGATIVE
POC MOLECULAR INFLUENZA B AGN: NEGATIVE
PROTEIN, POC: ABNORMAL
SARS-COV-2 RDRP RESP QL NAA+PROBE: NEGATIVE
SPECIFIC GRAVITY, POC UA: 1.02
UROBILINOGEN, POC UA: 1

## 2024-02-25 PROCEDURE — 87502 INFLUENZA DNA AMP PROBE: CPT | Mod: QW,,, | Performed by: NURSE PRACTITIONER

## 2024-02-25 PROCEDURE — 1159F MED LIST DOCD IN RCRD: CPT | Mod: CPTII,,, | Performed by: NURSE PRACTITIONER

## 2024-02-25 PROCEDURE — 99214 OFFICE O/P EST MOD 30 MIN: CPT | Mod: ,,, | Performed by: NURSE PRACTITIONER

## 2024-02-25 PROCEDURE — 99051 MED SERV EVE/WKEND/HOLIDAY: CPT | Mod: ,,, | Performed by: NURSE PRACTITIONER

## 2024-02-25 PROCEDURE — 1160F RVW MEDS BY RX/DR IN RCRD: CPT | Mod: CPTII,,, | Performed by: NURSE PRACTITIONER

## 2024-02-25 PROCEDURE — 81025 URINE PREGNANCY TEST: CPT | Mod: QW,,, | Performed by: NURSE PRACTITIONER

## 2024-02-25 PROCEDURE — S0119 ONDANSETRON 4 MG: HCPCS | Mod: ,,, | Performed by: NURSE PRACTITIONER

## 2024-02-25 PROCEDURE — 87086 URINE CULTURE/COLONY COUNT: CPT | Mod: ,,, | Performed by: CLINICAL MEDICAL LABORATORY

## 2024-02-25 PROCEDURE — 81003 URINALYSIS AUTO W/O SCOPE: CPT | Mod: QW,,, | Performed by: NURSE PRACTITIONER

## 2024-02-25 PROCEDURE — 87635 SARS-COV-2 COVID-19 AMP PRB: CPT | Mod: QW,,, | Performed by: NURSE PRACTITIONER

## 2024-02-25 PROCEDURE — 3008F BODY MASS INDEX DOCD: CPT | Mod: CPTII,,, | Performed by: NURSE PRACTITIONER

## 2024-02-25 RX ORDER — ONDANSETRON 4 MG/1
4 TABLET, ORALLY DISINTEGRATING ORAL EVERY 8 HOURS PRN
Qty: 15 TABLET | Refills: 0 | Status: SHIPPED | OUTPATIENT
Start: 2024-02-25 | End: 2024-03-22 | Stop reason: SDUPTHER

## 2024-02-25 RX ORDER — ONDANSETRON 4 MG/1
4 TABLET, ORALLY DISINTEGRATING ORAL
Status: COMPLETED | OUTPATIENT
Start: 2024-02-25 | End: 2024-02-25

## 2024-02-25 RX ADMIN — ONDANSETRON 4 MG: 4 TABLET, ORALLY DISINTEGRATING ORAL at 07:02

## 2024-02-26 NOTE — PROGRESS NOTES
"Subjective:       Patient ID: Kavon Denise is a 24 y.o. female.    Chief Complaint: Nausea (X- 2 days )    Presents to clinic with c/o N/V/D and fever. Symptoms started yesterday. Vomited several times yesterday. Has not vomited since 0800 this AM. Had some diarrhea yesterday. Two episodes today. Feels feverish. LMP 1/15/24 and normal. Started Depo-provera in January. No cough, congestion or sore throat. She has been eating chips and sour cream and onion dip in room.       Review of Systems   Constitutional:  Positive for chills and malaise/fatigue.   Respiratory: Negative.     Cardiovascular: Negative.    Gastrointestinal:  Positive for diarrhea, nausea and vomiting. Negative for abdominal pain.   Genitourinary: Negative.           Reviewed family, medical, surgical, and social history.    Objective:      BP (!) 154/98 (BP Location: Right arm, Patient Position: Sitting, BP Method: Medium (Manual))   Pulse 73   Temp 98.6 °F (37 °C) (Oral)   Resp 18   Ht 5' 3" (1.6 m)   Wt 58.1 kg (128 lb)   SpO2 99%   BMI 22.67 kg/m²   Physical Exam  Vitals and nursing note reviewed.   Constitutional:       General: She is not in acute distress.     Appearance: Normal appearance. She is not ill-appearing, toxic-appearing or diaphoretic.   HENT:      Head: Normocephalic.      Right Ear: Tympanic membrane, ear canal and external ear normal.      Left Ear: Tympanic membrane, ear canal and external ear normal.      Nose: Nose normal.      Mouth/Throat:      Mouth: Mucous membranes are moist.      Pharynx: No oropharyngeal exudate or posterior oropharyngeal erythema.   Cardiovascular:      Rate and Rhythm: Normal rate and regular rhythm.      Heart sounds: Normal heart sounds.   Pulmonary:      Effort: Pulmonary effort is normal.      Breath sounds: Normal breath sounds.   Abdominal:      General: Abdomen is flat. Bowel sounds are normal. There is no distension.      Palpations: Abdomen is soft. There is no mass.      " Tenderness: There is no abdominal tenderness. There is no right CVA tenderness, left CVA tenderness, guarding or rebound.      Hernia: No hernia is present.   Musculoskeletal:      Cervical back: Normal range of motion and neck supple.   Skin:     General: Skin is warm and dry.      Capillary Refill: Capillary refill takes less than 2 seconds.   Neurological:      Mental Status: She is alert and oriented to person, place, and time.   Psychiatric:         Mood and Affect: Mood normal.         Behavior: Behavior normal.         Thought Content: Thought content normal.         Judgment: Judgment normal.            Office Visit on 02/25/2024   Component Date Value Ref Range Status    POC Preg Test, Ur 02/25/2024 Negative  Negative Final     Acceptable 02/25/2024 Yes   Final    Color, UA 02/25/2024 Yellow   Final    Spec Grav UA 02/25/2024 1.025   Final    pH, UA 02/25/2024 7.0   Final    WBC, UA 02/25/2024 Negative   Final    Nitrite, UA 02/25/2024 Negative   Final    Protein, POC 02/25/2024 30 mg   Final    Glucose, UA 02/25/2024 Negative   Final    Ketones, UA 02/25/2024 Negative   Final    Bilirubin, POC 02/25/2024 Small   Final    Urobilinogen, UA 02/25/2024 1.0   Final    Blood, UA 02/25/2024 Trace   Final    POC Rapid COVID 02/25/2024 Negative  Negative Final     Acceptable 02/25/2024 Yes   Final    Rapid Influenza A Ag 02/25/2024 Negative  Negative Final    Rapid Influenza B Ag 02/25/2024 Negative  Negative Final     Acceptable 02/25/2024 Yes   Final      Assessment:       1. Viral illness    2. Nausea and vomiting, unspecified vomiting type        Plan:       Viral illness  -     ondansetron (ZOFRAN-ODT) 4 MG TbDL; Take 1 tablet (4 mg total) by mouth every 8 (eight) hours as needed (N/V).  Dispense: 15 tablet; Refill: 0  -     POCT COVID-19 Rapid Screening  -     POCT Influenza A/B    Nausea and vomiting, unspecified vomiting type  -     POCT urine pregnancy  -      Urine culture; Future; Expected date: 02/25/2024  -     POCT URINALYSIS W/O SCOPE  -     ondansetron (ZOFRAN-ODT) 4 MG TbDL; Take 1 tablet (4 mg total) by mouth every 8 (eight) hours as needed (N/V).  Dispense: 15 tablet; Refill: 0  -     ondansetron disintegrating tablet 4 mg    Drink Gatorade as long as having fluids  Avoid greasy/spicy foods  Go to ER with intractable N/V  Retest with new or continued s/s  RTC PRN          Risks, benefits, and side effects were discussed with the patient. All questions were answered to the fullest satisfaction of the patient, and pt verbalized understanding and agreement to treatment plan. Pt was to call with any new or worsening symptoms, or present to the ER.

## 2024-02-28 LAB — UA COMPLETE W REFLEX CULTURE PNL UR: NORMAL

## 2024-03-22 ENCOUNTER — OFFICE VISIT (OUTPATIENT)
Dept: FAMILY MEDICINE | Facility: CLINIC | Age: 24
End: 2024-03-22
Payer: MEDICAID

## 2024-03-22 ENCOUNTER — PATIENT MESSAGE (OUTPATIENT)
Dept: FAMILY MEDICINE | Facility: CLINIC | Age: 24
End: 2024-03-22
Payer: MEDICAID

## 2024-03-22 VITALS
OXYGEN SATURATION: 100 % | HEART RATE: 68 BPM | TEMPERATURE: 99 F | SYSTOLIC BLOOD PRESSURE: 149 MMHG | DIASTOLIC BLOOD PRESSURE: 108 MMHG | HEIGHT: 63 IN | BODY MASS INDEX: 23.57 KG/M2 | RESPIRATION RATE: 17 BRPM | WEIGHT: 133 LBS

## 2024-03-22 DIAGNOSIS — R11.2 NAUSEA AND VOMITING, UNSPECIFIED VOMITING TYPE: ICD-10-CM

## 2024-03-22 DIAGNOSIS — R10.9 ABDOMINAL PAIN, UNSPECIFIED ABDOMINAL LOCATION: ICD-10-CM

## 2024-03-22 DIAGNOSIS — R11.10 VOMITING, UNSPECIFIED VOMITING TYPE, UNSPECIFIED WHETHER NAUSEA PRESENT: Primary | ICD-10-CM

## 2024-03-22 LAB
ALBUMIN SERPL BCP-MCNC: 4.6 G/DL (ref 3.5–5)
ALBUMIN/GLOB SERPL: 1.2 {RATIO}
ALP SERPL-CCNC: 52 U/L (ref 37–98)
ALT SERPL W P-5'-P-CCNC: 13 U/L (ref 13–56)
AMYLASE SERPL-CCNC: 148 U/L (ref 25–115)
ANION GAP SERPL CALCULATED.3IONS-SCNC: 7 MMOL/L (ref 7–16)
AST SERPL W P-5'-P-CCNC: 6 U/L (ref 15–37)
BASOPHILS # BLD AUTO: 0.03 K/UL (ref 0–0.2)
BASOPHILS NFR BLD AUTO: 1 % (ref 0–1)
BILIRUB SERPL-MCNC: 0.9 MG/DL (ref ?–1.2)
BILIRUB SERPL-MCNC: NEGATIVE MG/DL
BLOOD URINE, POC: NEGATIVE
BUN SERPL-MCNC: 9 MG/DL (ref 7–18)
BUN/CREAT SERPL: 11 (ref 6–20)
CALCIUM SERPL-MCNC: 9.5 MG/DL (ref 8.5–10.1)
CHLORIDE SERPL-SCNC: 109 MMOL/L (ref 98–107)
CO2 SERPL-SCNC: 27 MMOL/L (ref 21–32)
COLOR, POC UA: YELLOW
CREAT SERPL-MCNC: 0.81 MG/DL (ref 0.55–1.02)
DIFFERENTIAL METHOD BLD: ABNORMAL
EGFR (NO RACE VARIABLE) (RUSH/TITUS): 103 ML/MIN/1.73M2
EOSINOPHIL # BLD AUTO: 0.03 K/UL (ref 0–0.5)
EOSINOPHIL NFR BLD AUTO: 1 % (ref 1–4)
ERYTHROCYTE [DISTWIDTH] IN BLOOD BY AUTOMATED COUNT: 11.1 % (ref 11.5–14.5)
GLOBULIN SER-MCNC: 3.7 G/DL (ref 2–4)
GLUCOSE SERPL-MCNC: 82 MG/DL (ref 74–106)
GLUCOSE UR QL STRIP: NEGATIVE
HCT VFR BLD AUTO: 41.9 % (ref 38–47)
HGB BLD-MCNC: 14.5 G/DL (ref 12–16)
IMM GRANULOCYTES # BLD AUTO: 0 K/UL (ref 0–0.04)
IMM GRANULOCYTES NFR BLD: 0 % (ref 0–0.4)
KETONES UR QL STRIP: NEGATIVE
LEUKOCYTE ESTERASE URINE, POC: NEGATIVE
LIPASE SERPL-CCNC: 20 U/L (ref 16–77)
LYMPHOCYTES # BLD AUTO: 1.59 K/UL (ref 1–4.8)
LYMPHOCYTES NFR BLD AUTO: 50.8 % (ref 27–41)
MCH RBC QN AUTO: 33 PG (ref 27–31)
MCHC RBC AUTO-ENTMCNC: 34.6 G/DL (ref 32–36)
MCV RBC AUTO: 95.4 FL (ref 80–96)
MONOCYTES # BLD AUTO: 0.22 K/UL (ref 0–0.8)
MONOCYTES NFR BLD AUTO: 7 % (ref 2–6)
MPC BLD CALC-MCNC: 9.4 FL (ref 9.4–12.4)
NEUTROPHILS # BLD AUTO: 1.26 K/UL (ref 1.8–7.7)
NEUTROPHILS NFR BLD AUTO: 40.2 % (ref 53–65)
NITRITE, POC UA: NEGATIVE
NRBC # BLD AUTO: 0 X10E3/UL
NRBC, AUTO (.00): 0 %
PH, POC UA: 7
PLATELET # BLD AUTO: 305 K/UL (ref 150–400)
POTASSIUM SERPL-SCNC: 3.7 MMOL/L (ref 3.5–5.1)
PROT SERPL-MCNC: 8.3 G/DL (ref 6.4–8.2)
PROTEIN, POC: 30
RBC # BLD AUTO: 4.39 M/UL (ref 4.2–5.4)
SODIUM SERPL-SCNC: 139 MMOL/L (ref 136–145)
SPECIFIC GRAVITY, POC UA: 1.02
UROBILINOGEN, POC UA: 0.2
WBC # BLD AUTO: 3.13 K/UL (ref 4.5–11)

## 2024-03-22 PROCEDURE — 1160F RVW MEDS BY RX/DR IN RCRD: CPT | Mod: CPTII,,, | Performed by: NURSE PRACTITIONER

## 2024-03-22 PROCEDURE — 3077F SYST BP >= 140 MM HG: CPT | Mod: CPTII,,, | Performed by: NURSE PRACTITIONER

## 2024-03-22 PROCEDURE — 81003 URINALYSIS AUTO W/O SCOPE: CPT | Mod: QW,,, | Performed by: NURSE PRACTITIONER

## 2024-03-22 PROCEDURE — 1159F MED LIST DOCD IN RCRD: CPT | Mod: CPTII,,, | Performed by: NURSE PRACTITIONER

## 2024-03-22 PROCEDURE — 87086 URINE CULTURE/COLONY COUNT: CPT | Mod: ,,, | Performed by: CLINICAL MEDICAL LABORATORY

## 2024-03-22 PROCEDURE — 99214 OFFICE O/P EST MOD 30 MIN: CPT | Mod: ,,, | Performed by: NURSE PRACTITIONER

## 2024-03-22 PROCEDURE — 85025 COMPLETE CBC W/AUTO DIFF WBC: CPT | Mod: ,,, | Performed by: CLINICAL MEDICAL LABORATORY

## 2024-03-22 PROCEDURE — 3080F DIAST BP >= 90 MM HG: CPT | Mod: CPTII,,, | Performed by: NURSE PRACTITIONER

## 2024-03-22 PROCEDURE — 83690 ASSAY OF LIPASE: CPT | Mod: ,,, | Performed by: CLINICAL MEDICAL LABORATORY

## 2024-03-22 PROCEDURE — 80053 COMPREHEN METABOLIC PANEL: CPT | Mod: ,,, | Performed by: CLINICAL MEDICAL LABORATORY

## 2024-03-22 PROCEDURE — 3008F BODY MASS INDEX DOCD: CPT | Mod: CPTII,,, | Performed by: NURSE PRACTITIONER

## 2024-03-22 PROCEDURE — 82150 ASSAY OF AMYLASE: CPT | Mod: ,,, | Performed by: CLINICAL MEDICAL LABORATORY

## 2024-03-22 RX ORDER — ONDANSETRON 4 MG/1
4 TABLET, ORALLY DISINTEGRATING ORAL EVERY 8 HOURS PRN
Qty: 30 TABLET | Refills: 2 | Status: SHIPPED | OUTPATIENT
Start: 2024-03-22

## 2024-03-22 NOTE — PATIENT INSTRUCTIONS
US gallbladder scheduled for 4/10/24 at 10:00 NPO after midnight the night before.  I will call with lab results  Go to ER with vomiting that dose not respond to zofran  Avoid foods high in fat and fast food  I am referring to GI. If they have not called you in 1 week, F/U with referral center at [phone]  RTC PRN

## 2024-03-22 NOTE — PROGRESS NOTES
"Subjective:       Patient ID: Kavon Denise is a 24 y.o. female.    Chief Complaint: Vomiting (Vomiting started last Sunday. Able to tolerate snacks(chips and fruits) but not able to keep down a meal. )    Presents to clinic as above. S/S began Sunday. The most she has vomited in one day is 3 times. States can hold down liquids and snack foods as well as fruit. Has had some mild abd cramping. No diarrhea. Reports last BM last night and normal. She attributes all of this to getting Depo-provera shot in January because she had a similar episode in February.  The shot in January was her first injection. Smokes marijuana daily      Review of Systems   Constitutional: Negative.    HENT: Negative.     Respiratory: Negative.     Cardiovascular: Negative.    Gastrointestinal:  Positive for abdominal pain, nausea and vomiting. Negative for blood in stool, constipation, diarrhea and melena.   Genitourinary: Negative.           Reviewed family, medical, surgical, and social history.    Objective:      BP (!) 149/108 (BP Location: Left arm, Patient Position: Sitting, BP Method: Medium (Automatic))   Pulse 68   Temp 99.2 °F (37.3 °C) (Oral)   Resp 17   Ht 5' 3" (1.6 m)   Wt 60.3 kg (133 lb)   LMP 01/18/2024 Comment: irreuglar menstrual cycle due to birth control.  SpO2 100%   BMI 23.56 kg/m²   Physical Exam  Vitals and nursing note reviewed.   Constitutional:       General: She is not in acute distress.     Appearance: Normal appearance. She is normal weight. She is not ill-appearing, toxic-appearing or diaphoretic.   HENT:      Head: Normocephalic.      Right Ear: Tympanic membrane, ear canal and external ear normal.      Left Ear: Tympanic membrane, ear canal and external ear normal.      Nose: Nose normal.      Mouth/Throat:      Mouth: Mucous membranes are moist.   Cardiovascular:      Rate and Rhythm: Normal rate and regular rhythm.      Heart sounds: Normal heart sounds.   Pulmonary:      Effort: Pulmonary " effort is normal.      Breath sounds: Normal breath sounds.   Abdominal:      General: Abdomen is flat. Bowel sounds are normal. There is no distension.      Palpations: Abdomen is soft. There is no mass.      Tenderness: There is no abdominal tenderness. There is no right CVA tenderness, left CVA tenderness, guarding or rebound.      Hernia: No hernia is present.   Musculoskeletal:      Cervical back: Normal range of motion and neck supple.   Skin:     General: Skin is warm and dry.      Capillary Refill: Capillary refill takes less than 2 seconds.   Neurological:      Mental Status: She is alert and oriented to person, place, and time.   Psychiatric:         Mood and Affect: Mood normal.         Behavior: Behavior normal.         Thought Content: Thought content normal.         Judgment: Judgment normal.            Office Visit on 03/22/2024   Component Date Value Ref Range Status    Color, UA 03/22/2024 Yellow   Final    Spec Grav UA 03/22/2024 1.025   Final    pH, UA 03/22/2024 7.0   Final    WBC, UA 03/22/2024 negative   Final    Nitrite, UA 03/22/2024 negative   Final    Protein, POC 03/22/2024 30   Final    Glucose, UA 03/22/2024 negative   Final    Ketones, UA 03/22/2024 negative   Final    Bilirubin, POC 03/22/2024 negative   Final    Urobilinogen, UA 03/22/2024 0.2   Final    Blood, UA 03/22/2024 negative   Final      Assessment:       1. Vomiting, unspecified vomiting type, unspecified whether nausea present    2. Abdominal pain, unspecified abdominal location    3. Nausea and vomiting, unspecified vomiting type        Plan:       Vomiting, unspecified vomiting type, unspecified whether nausea present  -     CBC Auto Differential; Future; Expected date: 03/22/2024  -     Comprehensive Metabolic Panel; Future; Expected date: 03/22/2024  -     Amylase; Future; Expected date: 03/22/2024  -     Lipase; Future; Expected date: 03/22/2024  -     US Abdomen Limited_Gallbladder; Future; Expected date:  03/22/2024  -     Ambulatory referral/consult to Gastroenterology; Future; Expected date: 03/29/2024  -     X-Ray Abdomen AP 1 View; Future; Expected date: 03/22/2024    Abdominal pain, unspecified abdominal location  -     CBC Auto Differential; Future; Expected date: 03/22/2024  -     Comprehensive Metabolic Panel; Future; Expected date: 03/22/2024  -     Amylase; Future; Expected date: 03/22/2024  -     Lipase; Future; Expected date: 03/22/2024  -     US Abdomen Limited_Gallbladder; Future; Expected date: 03/22/2024  -     Ambulatory referral/consult to Gastroenterology; Future; Expected date: 03/29/2024  -     POCT URINALYSIS W/O SCOPE  -     Urine culture; Future; Expected date: 03/22/2024  -     X-Ray Abdomen AP 1 View; Future; Expected date: 03/22/2024    Nausea and vomiting, unspecified vomiting type  -     ondansetron (ZOFRAN-ODT) 4 MG TbDL; Take 1 tablet (4 mg total) by mouth every 8 (eight) hours as needed (N/V).  Dispense: 30 tablet; Refill: 2  -     X-Ray Abdomen AP 1 View; Future; Expected date: 03/22/2024    Instructed this could be hyperemesis due to cannibis. Enc to quit.   US gallbladder scheduled for 4/10/24 at 10:00 NPO after midnight the night before.  I will call with lab results  Go to ER with vomiting that dose not respond to zofran  Avoid foods high in fat and fast food  RTC PRN           Risks, benefits, and side effects were discussed with the patient. All questions were answered to the fullest satisfaction of the patient, and pt verbalized understanding and agreement to treatment plan. Pt was to call with any new or worsening symptoms, or present to the ER.

## 2024-03-23 NOTE — PROGRESS NOTES
Notified of mildly elevated amylase. Notified of mildly low WBC. She could have a GI viral illness. She feels better today. Repeat amylase tomorrow. RTC PRN. Voiced agreement.

## 2024-03-24 ENCOUNTER — CLINICAL SUPPORT (OUTPATIENT)
Dept: LAB | Facility: CLINIC | Age: 24
End: 2024-03-24
Payer: MEDICAID

## 2024-03-24 DIAGNOSIS — R74.8 ELEVATED AMYLASE: Primary | ICD-10-CM

## 2024-03-24 LAB
AMYLASE SERPL-CCNC: 150 U/L (ref 25–115)
UA COMPLETE W REFLEX CULTURE PNL UR: NORMAL

## 2024-03-24 PROCEDURE — 82150 ASSAY OF AMYLASE: CPT | Mod: ,,, | Performed by: CLINICAL MEDICAL LABORATORY

## 2024-03-25 ENCOUNTER — OFFICE VISIT (OUTPATIENT)
Dept: GASTROENTEROLOGY | Facility: CLINIC | Age: 24
End: 2024-03-25

## 2024-03-25 VITALS
DIASTOLIC BLOOD PRESSURE: 95 MMHG | BODY MASS INDEX: 23.21 KG/M2 | SYSTOLIC BLOOD PRESSURE: 142 MMHG | HEART RATE: 111 BPM | WEIGHT: 131 LBS | HEIGHT: 63 IN

## 2024-03-25 DIAGNOSIS — R10.9 ABDOMINAL PAIN, UNSPECIFIED ABDOMINAL LOCATION: ICD-10-CM

## 2024-03-25 DIAGNOSIS — R11.10 VOMITING, UNSPECIFIED VOMITING TYPE, UNSPECIFIED WHETHER NAUSEA PRESENT: ICD-10-CM

## 2024-03-25 PROCEDURE — 99203 OFFICE O/P NEW LOW 30 MIN: CPT | Mod: S$PBB,,,

## 2024-03-25 PROCEDURE — 99214 OFFICE O/P EST MOD 30 MIN: CPT | Mod: PBBFAC

## 2024-03-25 NOTE — PROGRESS NOTES
Gastroenterology Clinic Note    Patient ID: [id number]   Referring MD: Catrachita Norton FNP   Chief Complaint:   Chief Complaint   Patient presents with    Nausea    Emesis     For about 2 weeks       History of Present Illness   Kavon Denise is an 24 y.o. AAF who is referred for nausea and vomiting. Patient reports onset of symptoms 2 weeks ago. She reports vomiting after meals; able to tolerate snacks. Reports intermittent abdominal cramping that is relieved with bowel movement. Denies any hematochezia or melena. Xray at [facility] shows increased stool. She is scheduled for abdominal US on 04/10/24. She smokes marijuana occasionally since age 17.    Previous workup:Xray Abdomen       Review of Systems   Constitutional:  Negative for weight loss.   Gastrointestinal:  Positive for abdominal pain, nausea and vomiting. Negative for blood in stool, constipation, diarrhea, heartburn and melena.       Past Medical History      Past Medical History:   Diagnosis Date    Ovarian cyst        Past Surgical History     Past Surgical History:   Procedure Laterality Date    OVARIAN CYST REMOVAL Right        Allergies   Review of patient's allergies indicates:  No Known Allergies    Immunization History     Immunization History   Administered Date(s) Administered    Influenza - Quadrivalent - PF *Preferred* (6 months and older) 10/13/2022    PPD Test 09/26/2022       Past Family History      Family History   Problem Relation Age of Onset    Hypertension Mother     Hypertension Maternal Grandmother     Diabetes Maternal Grandmother        Past Social History      Social History     Socioeconomic History    Marital status: Single   Tobacco Use    Smoking status: Former     Types: Cigars    Smokeless tobacco: Never   Substance and Sexual Activity    Alcohol use: Not Currently    Drug use: Not Currently    Sexual activity: Yes       Current Medications     Outpatient Medications Marked as Taking for the 3/25/24 encounter (Office Visit) with  "Susan Cheney, VENU   Medication Sig Dispense Refill    ondansetron (ZOFRAN-ODT) 4 MG TbDL Take 1 tablet (4 mg total) by mouth every 8 (eight) hours as needed (N/V). 30 tablet 2        I have reviewed the current medications, allergies, vital signs, past medical and surgical history, family medical history, and social history for this encounter and agree with all findings.    OBJECTIVE    Physical Exam    BP (!) 142/95   Pulse (!) 111   Ht 5' 3" (1.6 m)   Wt 59.4 kg (131 lb)   LMP 01/18/2024 Comment: irreuglar menstrual cycle due to birth control.  BMI 23.21 kg/m²   GEN: Well appearing, cooperative, NAD  NECK: Supple, no LAD  CV: Normal rate  RESP: Unlabored  ABD: ND, NT, soft, no guarding  EXT: No clubbing, cyanosis, or edema  SKIN: Warm and dry  NEURO: AAO x4.     LABS    CBC (with or without Differential):   Lab Results   Component Value Date    WBC 3.13 (L) 03/22/2024    HGB 14.5 03/22/2024    HCT 41.9 03/22/2024    MCV 95.4 03/22/2024    MCH 33.0 (H) 03/22/2024    MCHC 34.6 03/22/2024    RDW 11.1 (L) 03/22/2024     03/22/2024    MPV 9.4 03/22/2024    NEUTOPHILPCT 40.2 (L) 03/22/2024    DIFFTYPE Auto 03/22/2024     BMP/CMP:   Lab Results   Component Value Date     03/22/2024    K 3.7 03/22/2024     (H) 03/22/2024    CO2 27 03/22/2024    BUN 9 03/22/2024    CREATININE 0.81 03/22/2024    GLU 82 03/22/2024    CALCIUM 9.5 03/22/2024    ALBUMIN 4.6 03/22/2024    AST 6 (L) 03/22/2024    ALT 13 03/22/2024    ALKPHOS 52 03/22/2024        IMAGING    Xray Abdomen AP   - Nonspecific supine exam of the abdomen     ASSESSMENT  Sincereaparna Denise is a 24 y.o. AAF with no significant medical history who is referred for nausea and vomiting.    1. Vomiting, unspecified vomiting type, unspecified whether nausea present    2. Abdominal pain, unspecified abdominal location           PLAN    - If US is negative and bowel cleanse followed by daily regimen does not improve symptoms, consider EGD to rule out PUD. "   - Discussed avoiding marijuana as CHS could be a contributing factor  Patient Instructions   - I recommend a bowel cleanse with a gatorade miralax prep: take 20 mg of dulcolax orally and then mix 238 grams of miralax in 64 oz of your favorite zero sugar gatorade and drink over a 4 hour period on a day when you will be at home  - After your bowel cleanse, I recommend starting a daily fiber supplement with Citrucel or Fibercon (can purchase at your local pharmacy)  - I recommend that you also use Miralax 17 grams daily-to-twice daily as needed to have a regular, soft BM without straining - you can adjust how often you need miralax, but start with daily dosing and go from there  - I recommend drinking at least 60-80 ounces of water daily unless you have a medical condition that requires fluid restriction        No orders of the defined types were placed in this encounter.        The risks and benefits of my recommendations, as well as other treatment options were discussed with the patient today. All questions were answered.    40 minutes of total time spent on the encounter, which includes face to face time and non-face to face time preparing to see the patient (eg, review of tests), obtaining and/or reviewing separately obtained history, documenting clinical information in the electronic or other health record, Independently interpreting results (not separately reported) and communicating results to the patient/family/caregiver, or care coordination (not separately reported).        Susan Cheney, FNP/ACNP  [facility]

## 2024-04-06 ENCOUNTER — HOSPITAL ENCOUNTER (EMERGENCY)
Facility: HOSPITAL | Age: 24
Discharge: HOME | End: 2024-04-06
Attending: EMERGENCY MEDICINE
Payer: MEDICAID

## 2024-04-06 VITALS
TEMPERATURE: 98 F | RESPIRATION RATE: 17 BRPM | HEART RATE: 75 BPM | HEIGHT: 62 IN | BODY MASS INDEX: 24.66 KG/M2 | SYSTOLIC BLOOD PRESSURE: 145 MMHG | OXYGEN SATURATION: 99 % | DIASTOLIC BLOOD PRESSURE: 95 MMHG | WEIGHT: 134 LBS

## 2024-04-06 DIAGNOSIS — R31.9 URINARY TRACT INFECTION WITH HEMATURIA, SITE UNSPECIFIED: Primary | ICD-10-CM

## 2024-04-06 DIAGNOSIS — N39.0 URINARY TRACT INFECTION WITH HEMATURIA, SITE UNSPECIFIED: Primary | ICD-10-CM

## 2024-04-06 LAB
ALBUMIN SERPL BCP-MCNC: 4.1 G/DL (ref 3.5–5)
ALBUMIN/GLOB SERPL: 1 {RATIO}
ALP SERPL-CCNC: 62 U/L (ref 37–98)
ALT SERPL W P-5'-P-CCNC: 19 U/L (ref 13–56)
AMORPHOUS CRYSTALS, UA (OHS): ABNORMAL /HPF
ANION GAP SERPL CALCULATED.3IONS-SCNC: 11 MMOL/L (ref 7–16)
AST SERPL W P-5'-P-CCNC: 25 U/L (ref 15–37)
B-HCG UR QL: NEGATIVE
BACTERIA #/AREA URNS HPF: ABNORMAL /HPF
BASOPHILS # BLD AUTO: 0.03 K/UL (ref 0–0.2)
BASOPHILS NFR BLD AUTO: 0.5 % (ref 0–1)
BILIRUB SERPL-MCNC: 0.7 MG/DL (ref ?–1.2)
BILIRUB UR QL STRIP: NEGATIVE
BUN SERPL-MCNC: 12 MG/DL (ref 7–18)
BUN/CREAT SERPL: 18 (ref 6–20)
CALCIUM SERPL-MCNC: 9.5 MG/DL (ref 8.5–10.1)
CHLORIDE SERPL-SCNC: 108 MMOL/L (ref 98–107)
CLARITY UR: ABNORMAL
CO2 SERPL-SCNC: 24 MMOL/L (ref 21–32)
COLOR UR: YELLOW
CREAT SERPL-MCNC: 0.68 MG/DL (ref 0.55–1.02)
CTP QC/QA: YES
DIFFERENTIAL METHOD BLD: ABNORMAL
EGFR (NO RACE VARIABLE) (RUSH/TITUS): 124 ML/MIN/1.73M2
EOSINOPHIL # BLD AUTO: 0.04 K/UL (ref 0–0.5)
EOSINOPHIL NFR BLD AUTO: 0.6 % (ref 1–4)
ERYTHROCYTE [DISTWIDTH] IN BLOOD BY AUTOMATED COUNT: 11 % (ref 11.5–14.5)
GLOBULIN SER-MCNC: 4 G/DL (ref 2–4)
GLUCOSE SERPL-MCNC: 82 MG/DL (ref 74–106)
GLUCOSE UR STRIP-MCNC: NORMAL MG/DL
HCT VFR BLD AUTO: 38.7 % (ref 38–47)
HGB BLD-MCNC: 13.5 G/DL (ref 12–16)
IMM GRANULOCYTES # BLD AUTO: 0.02 K/UL (ref 0–0.04)
IMM GRANULOCYTES NFR BLD: 0.3 % (ref 0–0.4)
KETONES UR STRIP-SCNC: NEGATIVE MG/DL
LEUKOCYTE ESTERASE UR QL STRIP: 250
LIPASE SERPL-CCNC: 23 U/L (ref 16–77)
LYMPHOCYTES # BLD AUTO: 1.82 K/UL (ref 1–4.8)
LYMPHOCYTES NFR BLD AUTO: 29.2 % (ref 27–41)
MCH RBC QN AUTO: 33.1 PG (ref 27–31)
MCHC RBC AUTO-ENTMCNC: 34.9 G/DL (ref 32–36)
MCV RBC AUTO: 94.9 FL (ref 80–96)
MONOCYTES # BLD AUTO: 0.58 K/UL (ref 0–0.8)
MONOCYTES NFR BLD AUTO: 9.3 % (ref 2–6)
MPC BLD CALC-MCNC: 9.3 FL (ref 9.4–12.4)
MUCOUS, UA: ABNORMAL /LPF
NEUTROPHILS # BLD AUTO: 3.75 K/UL (ref 1.8–7.7)
NEUTROPHILS NFR BLD AUTO: 60.1 % (ref 53–65)
NITRITE UR QL STRIP: NEGATIVE
NRBC # BLD AUTO: 0 X10E3/UL
NRBC, AUTO (.00): 0 %
PH UR STRIP: 8 PH UNITS
PLATELET # BLD AUTO: 282 K/UL (ref 150–400)
POTASSIUM SERPL-SCNC: 3.8 MMOL/L (ref 3.5–5.1)
PROT SERPL-MCNC: 8.1 G/DL (ref 6.4–8.2)
PROT UR QL STRIP: 50
RBC # BLD AUTO: 4.08 M/UL (ref 4.2–5.4)
RBC # UR STRIP: ABNORMAL /UL
RBC #/AREA URNS HPF: 45 /HPF
SODIUM SERPL-SCNC: 139 MMOL/L (ref 136–145)
SP GR UR STRIP: 1.02
SQUAMOUS #/AREA URNS LPF: ABNORMAL /HPF
UROBILINOGEN UR STRIP-ACNC: NORMAL MG/DL
WBC # BLD AUTO: 6.24 K/UL (ref 4.5–11)
WBC #/AREA URNS HPF: 88 /HPF

## 2024-04-06 PROCEDURE — 99284 EMERGENCY DEPT VISIT MOD MDM: CPT | Mod: 25

## 2024-04-06 PROCEDURE — 81025 URINE PREGNANCY TEST: CPT | Performed by: EMERGENCY MEDICINE

## 2024-04-06 PROCEDURE — 87086 URINE CULTURE/COLONY COUNT: CPT | Performed by: EMERGENCY MEDICINE

## 2024-04-06 PROCEDURE — 81003 URINALYSIS AUTO W/O SCOPE: CPT | Performed by: EMERGENCY MEDICINE

## 2024-04-06 PROCEDURE — 99284 EMERGENCY DEPT VISIT MOD MDM: CPT | Mod: ,,, | Performed by: EMERGENCY MEDICINE

## 2024-04-06 PROCEDURE — 63600175 PHARM REV CODE 636 W HCPCS: Performed by: EMERGENCY MEDICINE

## 2024-04-06 PROCEDURE — 96360 HYDRATION IV INFUSION INIT: CPT

## 2024-04-06 PROCEDURE — 80053 COMPREHEN METABOLIC PANEL: CPT | Performed by: EMERGENCY MEDICINE

## 2024-04-06 PROCEDURE — 83690 ASSAY OF LIPASE: CPT | Performed by: EMERGENCY MEDICINE

## 2024-04-06 PROCEDURE — 85025 COMPLETE CBC W/AUTO DIFF WBC: CPT | Performed by: EMERGENCY MEDICINE

## 2024-04-06 RX ORDER — PHENAZOPYRIDINE HYDROCHLORIDE 200 MG/1
200 TABLET, FILM COATED ORAL
Qty: 15 TABLET | Refills: 0 | Status: SHIPPED | OUTPATIENT
Start: 2024-04-06 | End: 2024-04-11

## 2024-04-06 RX ORDER — SULFAMETHOXAZOLE AND TRIMETHOPRIM 800; 160 MG/1; MG/1
1 TABLET ORAL 2 TIMES DAILY
Qty: 14 TABLET | Refills: 0 | Status: SHIPPED | OUTPATIENT
Start: 2024-04-06 | End: 2024-04-13

## 2024-04-06 RX ADMIN — SODIUM CHLORIDE, POTASSIUM CHLORIDE, SODIUM LACTATE AND CALCIUM CHLORIDE 1000 ML: 600; 310; 30; 20 INJECTION, SOLUTION INTRAVENOUS at 07:04

## 2024-04-06 NOTE — ED PROVIDER NOTES
Encounter Date: 4/6/2024    SCRIBE #1 NOTE: I, Miguel Angel Pate, am scribing for, and in the presence of,  Samy Betts MD. I have scribed the entire note.       History     Chief Complaint   Patient presents with    Abdominal Pain     Pt c/o left sided lower abd pain, states she thinks she has a cyst on her ovary.     24 y.o.female presented to the ED for abdominal pain.PT stated that the pain is located in the lower abdomen. PT stated that she is having issues with urinating as well. PT also stated that she is having a fever and chills. PT has HX  of smoking and a medical HX of ovarian cyst.           The history is provided by the patient. No  was used.     Review of patient's allergies indicates:  No Known Allergies  Past Medical History:   Diagnosis Date    Ovarian cyst      Past Surgical History:   Procedure Laterality Date    OVARIAN CYST REMOVAL Right      Family History   Problem Relation Age of Onset    Hypertension Mother     Hypertension Maternal Grandmother     Diabetes Maternal Grandmother      Social History     Tobacco Use    Smoking status: Former     Types: Cigars    Smokeless tobacco: Never   Substance Use Topics    Alcohol use: Not Currently    Drug use: Not Currently     Review of Systems   Constitutional:  Positive for chills and fever.   Gastrointestinal:  Positive for abdominal pain.   Genitourinary:  Positive for difficulty urinating.   All other systems reviewed and are negative.      Physical Exam     Initial Vitals [04/06/24 1757]   BP Pulse Resp Temp SpO2   (!) 167/116 96 17 98.1 °F (36.7 °C) 99 %      MAP       --         Physical Exam    Nursing note and vitals reviewed.  Constitutional: She appears well-developed and well-nourished.   HENT:   Head: Normocephalic and atraumatic.   Eyes: EOM are normal. Pupils are equal, round, and reactive to light.   Neck: Neck supple. No thyromegaly present.   Normal range of motion.  Cardiovascular:  Normal rate, regular  rhythm, normal heart sounds and intact distal pulses.           No murmur heard.  Pulmonary/Chest: Breath sounds normal. She has no wheezes.   Abdominal: Abdomen is soft. Bowel sounds are normal. There is no abdominal tenderness.   Musculoskeletal:         General: Tenderness (abdomen) present. No edema. Normal range of motion.      Cervical back: Normal range of motion and neck supple.     Lymphadenopathy:     She has no cervical adenopathy.   Neurological: She is alert and oriented to person, place, and time. She has normal strength and normal reflexes. No cranial nerve deficit or sensory deficit. GCS score is 15. GCS eye subscore is 4. GCS verbal subscore is 5. GCS motor subscore is 6.   Skin: Skin is warm and dry. Capillary refill takes less than 2 seconds. No rash noted.   Psychiatric: She has a normal mood and affect.         ED Course   Procedures  Labs Reviewed   URINALYSIS, REFLEX TO URINE CULTURE - Abnormal; Notable for the following components:       Result Value    Protein, UA 50 (*)     Blood, UA 3+ (*)     All other components within normal limits   URINALYSIS, MICROSCOPIC - Abnormal; Notable for the following components:    WBC, UA 88 (*)     RBC, UA 45 (*)     Bacteria, UA Occasional (*)     Squamous Epithelial Cells, UA Moderate (*)     Amorphous Crystals, UA Occ (*)     Mucous Occasional (*)     All other components within normal limits   COMPREHENSIVE METABOLIC PANEL - Abnormal; Notable for the following components:    Chloride 108 (*)     All other components within normal limits   CBC WITH DIFFERENTIAL - Abnormal; Notable for the following components:    RBC 4.08 (*)     MCH 33.1 (*)     RDW 11.0 (*)     MPV 9.3 (*)     Monocytes % 9.3 (*)     Eosinophils % 0.6 (*)     All other components within normal limits   LIPASE - Normal   CULTURE, URINE   CBC W/ AUTO DIFFERENTIAL    Narrative:     The following orders were created for panel order CBC W/ AUTO DIFFERENTIAL.  Procedure                                Abnormality         Status                     ---------                               -----------         ------                     CBC with Differential[[id number]]       Abnormal            Final result                 Please view results for these tests on the individual orders.   POCT URINE PREGNANCY          Imaging Results              CT Abdomen Pelvis  Without Contrast (In process)                   X-Rays:   Independently Interpreted Readings:   Abdomen:   Abdomen and Pelvis without Contrast - Interpreted by Dr. Betts    No acute intra-abdominal abnormaility  No bowel obstruction or inflammation. Normal appendix  No hydronephrosis or renal calculus     Medications   lactated ringers bolus 1,000 mL (0 mLs Intravenous Stopped 4/6/24 2050)     Medical Decision Making  Amount and/or Complexity of Data Reviewed  Labs: ordered.  Radiology: ordered.    Risk  Prescription drug management.              Attending Attestation:           Physician Attestation for Scribe:  Physician Attestation Statement for Scribe #1: I, Talon Betts MD, reviewed documentation, as scribed by Miguel Angel Pate in my presence, and it is both accurate and complete.             ED Course as of 04/07/24 0128   Sat Apr 06, 2024   1857 24-year-old female patient came with lower abdominal pain and painful urination.  Physical examination revealed suprapubic tenderness.  Differential diagnosis:  Cystitis, kidney stone, urethritis [HK]      ED Course User Index  [HK] Samy Betts MD                             Clinical Impression:  Final diagnoses:  [N39.0, R31.9] Urinary tract infection with hematuria, site unspecified (Primary)          ED Disposition Condition    Discharge Stable          ED Prescriptions       Medication Sig Dispense Start Date End Date Auth. Provider    sulfamethoxazole-trimethoprim 800-160mg (BACTRIM DS) 800-160 mg Tab Take 1 tablet by mouth 2 (two) times daily. for 7 days 14 tablet 4/6/2024  4/13/2024 Samy Betts MD    phenazopyridine (PYRIDIUM) 200 MG tablet Take 1 tablet (200 mg total) by mouth 3 (three) times daily with meals. for 5 days 15 tablet 4/6/2024 4/11/2024 Samy Betts MD          Follow-up Information    None          Samy Betts MD  04/07/24 0128

## 2024-04-08 LAB
UA COMPLETE W REFLEX CULTURE PNL UR: ABNORMAL
UA COMPLETE W REFLEX CULTURE PNL UR: ABNORMAL

## 2024-04-09 ENCOUNTER — TELEPHONE (OUTPATIENT)
Dept: EMERGENCY MEDICINE | Facility: HOSPITAL | Age: 24
End: 2024-04-09
Payer: MEDICAID

## 2024-04-09 RX ORDER — CEFUROXIME AXETIL 500 MG/1
500 TABLET ORAL EVERY 12 HOURS
Qty: 20 TABLET | Refills: 0 | Status: SHIPPED | OUTPATIENT
Start: 2024-04-09 | End: 2024-04-19

## 2024-04-09 NOTE — TELEPHONE ENCOUNTER
----- Message from VENU Becerra sent at 4/9/2024  8:58 AM CDT -----  Please notify her that I sent in a prescription for Ceftin that she needs to take in addition to the prescription for Bactrim that she was given

## 2024-04-10 ENCOUNTER — HOSPITAL ENCOUNTER (OUTPATIENT)
Dept: RADIOLOGY | Facility: HOSPITAL | Age: 24
Discharge: HOME | End: 2024-04-10
Attending: NURSE PRACTITIONER
Payer: MEDICAID

## 2024-04-10 ENCOUNTER — PATIENT MESSAGE (OUTPATIENT)
Dept: EMERGENCY MEDICINE | Facility: HOSPITAL | Age: 24
End: 2024-04-10
Payer: MEDICAID

## 2024-04-10 PROCEDURE — 76705 ECHO EXAM OF ABDOMEN: CPT | Mod: TC

## 2024-04-10 PROCEDURE — 76705 ECHO EXAM OF ABDOMEN: CPT | Mod: 26,,, | Performed by: RADIOLOGY

## 2024-04-11 DIAGNOSIS — K80.20 CALCULUS OF GALLBLADDER WITHOUT CHOLECYSTITIS WITHOUT OBSTRUCTION: Primary | ICD-10-CM

## 2024-04-11 NOTE — PROGRESS NOTES
Notify of sludge and stones in gallbladder. I am referring to general surgery. F/U with us is they have not called in 1 week.

## 2024-05-08 ENCOUNTER — OFFICE VISIT (OUTPATIENT)
Dept: GASTROENTEROLOGY | Facility: CLINIC | Age: 24
End: 2024-05-08
Payer: MEDICAID

## 2024-05-08 VITALS
HEART RATE: 70 BPM | DIASTOLIC BLOOD PRESSURE: 89 MMHG | HEIGHT: 62 IN | WEIGHT: 133 LBS | BODY MASS INDEX: 24.48 KG/M2 | SYSTOLIC BLOOD PRESSURE: 138 MMHG

## 2024-05-08 DIAGNOSIS — R11.10 VOMITING, UNSPECIFIED VOMITING TYPE, UNSPECIFIED WHETHER NAUSEA PRESENT: ICD-10-CM

## 2024-05-08 DIAGNOSIS — R10.9 ABDOMINAL PAIN, UNSPECIFIED ABDOMINAL LOCATION: ICD-10-CM

## 2024-05-08 DIAGNOSIS — K80.20 CALCULUS OF GALLBLADDER WITHOUT CHOLECYSTITIS WITHOUT OBSTRUCTION: Primary | ICD-10-CM

## 2024-05-08 PROCEDURE — 99999 PR PBB SHADOW E&M-EST. PATIENT-LVL III: CPT | Mod: PBBFAC,,,

## 2024-05-08 PROCEDURE — 99214 OFFICE O/P EST MOD 30 MIN: CPT | Mod: S$PBB,,,

## 2024-05-08 PROCEDURE — 3008F BODY MASS INDEX DOCD: CPT | Mod: CPTII,,,

## 2024-05-08 PROCEDURE — 1160F RVW MEDS BY RX/DR IN RCRD: CPT | Mod: CPTII,,,

## 2024-05-08 PROCEDURE — 1159F MED LIST DOCD IN RCRD: CPT | Mod: CPTII,,,

## 2024-05-08 PROCEDURE — 99213 OFFICE O/P EST LOW 20 MIN: CPT | Mod: PBBFAC

## 2024-05-08 PROCEDURE — 3079F DIAST BP 80-89 MM HG: CPT | Mod: CPTII,,,

## 2024-05-08 PROCEDURE — 3075F SYST BP GE 130 - 139MM HG: CPT | Mod: CPTII,,,

## 2024-05-10 ENCOUNTER — CLINICAL SUPPORT (OUTPATIENT)
Dept: PRIMARY CARE CLINIC | Facility: CLINIC | Age: 24
End: 2024-05-10
Payer: MEDICAID

## 2024-05-10 DIAGNOSIS — Z02.89 ENCOUNTER FOR PHYSICAL EXAMINATION RELATED TO EMPLOYMENT: ICD-10-CM

## 2024-05-10 DIAGNOSIS — Z23 NEED FOR TDAP VACCINATION: ICD-10-CM

## 2024-05-10 DIAGNOSIS — Z78.9 VARICELLA VACCINATION STATUS UNKNOWN: ICD-10-CM

## 2024-05-10 DIAGNOSIS — Z02.83 ENCOUNTER FOR DRUG SCREENING: ICD-10-CM

## 2024-05-10 DIAGNOSIS — Z23 INFLUENZA VACCINE NEEDED: ICD-10-CM

## 2024-05-10 DIAGNOSIS — Z78.9 MEASLES, MUMPS, RUBELLA (MMR) VACCINATION STATUS UNKNOWN: ICD-10-CM

## 2024-05-10 DIAGNOSIS — Z11.1 SCREENING-PULMONARY TB: ICD-10-CM

## 2024-05-10 DIAGNOSIS — Z78.9 HEPATITIS B VACCINATION STATUS UNKNOWN: Primary | ICD-10-CM

## 2024-05-10 LAB — RUBV IGG SER-ACNC: 34.2 IU/ML

## 2024-05-10 PROCEDURE — 90715 TDAP VACCINE 7 YRS/> IM: CPT | Mod: ,,, | Performed by: NURSE PRACTITIONER

## 2024-05-10 PROCEDURE — 99000 SPECIMEN HANDLING OFFICE-LAB: CPT | Mod: ,,, | Performed by: NURSE PRACTITIONER

## 2024-05-10 PROCEDURE — 36415 COLL VENOUS BLD VENIPUNCTURE: CPT | Mod: ,,, | Performed by: NURSE PRACTITIONER

## 2024-05-10 PROCEDURE — 90686 IIV4 VACC NO PRSV 0.5 ML IM: CPT | Mod: ,,, | Performed by: NURSE PRACTITIONER

## 2024-05-10 PROCEDURE — 86765 RUBEOLA ANTIBODY: CPT | Performed by: NURSE PRACTITIONER

## 2024-05-10 PROCEDURE — 86735 MUMPS ANTIBODY: CPT | Performed by: NURSE PRACTITIONER

## 2024-05-10 PROCEDURE — 99499 UNLISTED E&M SERVICE: CPT | Mod: ,,, | Performed by: NURSE PRACTITIONER

## 2024-05-10 PROCEDURE — 86580 TB INTRADERMAL TEST: CPT | Mod: ,,, | Performed by: NURSE PRACTITIONER

## 2024-05-10 PROCEDURE — 86762 RUBELLA ANTIBODY: CPT | Performed by: NURSE PRACTITIONER

## 2024-05-10 PROCEDURE — 90471 IMMUNIZATION ADMIN: CPT | Mod: ,,, | Performed by: NURSE PRACTITIONER

## 2024-05-10 PROCEDURE — 86706 HEP B SURFACE ANTIBODY: CPT | Mod: 90 | Performed by: NURSE PRACTITIONER

## 2024-05-10 PROCEDURE — 86787 VARICELLA-ZOSTER ANTIBODY: CPT | Performed by: NURSE PRACTITIONER

## 2024-05-10 NOTE — PROGRESS NOTES
Subjective     Patient ID: Kavon Denise is a 24 y.o. female.    Chief Complaint: No chief complaint on file.    HPI  Review of Systems       Objective     Physical Exam       Assessment and Plan     1. Hepatitis B vaccination status unknown  -     Hepatitis B Surface Antibody, Qual/Quant    2. Influenza vaccine needed  -     Influenza - Quadrivalent *Preferred* (6 months+) (PF)    3. Varicella vaccination status unknown  -     Varicella Zoster Antibody, IgG    4. Measles, mumps, rubella (MMR) vaccination status unknown  -     Mumps, IgG Screen  -     Rubeola antibody IgG  -     Rubella antibody, IgG    5. Need for Tdap vaccination  -     Tdap Vaccine    6. Encounter for drug screening    7. Encounter for physical examination related to employment    8. Screening-pulmonary TB        See scanned documents in .   .         No follow-ups on file.

## 2024-05-11 LAB
HBV SURFACE AB SER QL IA: NEGATIVE
HBV SURFACE AB SERPL IA-ACNC: <3.5 MIU/ML

## 2024-05-13 LAB
TB INDURATION - 48 HR READ: NORMAL
TB INDURATION - 72 HR READ: NORMAL
TB SKIN TEST - 48 HR READ: NORMAL
TB SKIN TEST - 72 HR READ: NEGATIVE

## 2024-05-15 LAB
MEASLES IGG INDEX: 2.46
MEV IGG SER QL: POSITIVE
MUMPS AB IGG INDEX: 1.17
MUV IGG SER QL IA: POSITIVE
VARICELLA ZOSTER, BLOOD: NEGATIVE
VZV IGG INDEX: 0.46 (ref 0–0.9)

## 2024-06-13 NOTE — PROGRESS NOTES
Gastroenterology Clinic Note    Patient ID: [id number]   Referring MD: No ref. provider found   Chief Complaint:   Chief Complaint   Patient presents with    Follow-up     Condition improved        History of Present Illness   Kavon Denise is an 24 y.o. AAF who is referred for nausea and vomiting. Patient reports onset of symptoms 2 weeks ago. She reports vomiting after meals; able to tolerate snacks. Reports intermittent abdominal cramping that is relieved with bowel movement. Denies any hematochezia or melena. Xray at [facility] shows increased stool. She is scheduled for abdominal US on 04/10/24. She smokes marijuana occasionally since age 17.    Previous workup:  CT abdomen pelvis with IV contrast; ultrasound abdomen limited gallbladder; Xray Abdomen     Interval  - patient continues to experience postprandial abdominal pain, nausea and vomiting  - she has had 1 ER visit for this complaint since our previous clinic encounter  - ultrasound abdomen revealed gallstones    Review of Systems   Constitutional:  Negative for weight loss.   Gastrointestinal:  Positive for abdominal pain, nausea and vomiting. Negative for blood in stool, constipation, diarrhea, heartburn and melena.       Past Medical History      Past Medical History:   Diagnosis Date    Ovarian cyst        Past Surgical History     Past Surgical History:   Procedure Laterality Date    OVARIAN CYST REMOVAL Right        Allergies   Review of patient's allergies indicates:  No Known Allergies    Immunization History     Immunization History   Administered Date(s) Administered    Influenza - Quadrivalent - PF *Preferred* (6 months and older) 10/13/2022, 05/10/2024    PPD Test 09/26/2022, 05/10/2024    Tdap 05/10/2024       Past Family History      Family History   Problem Relation Name Age of Onset    Hypertension Mother      Hypertension Maternal Grandmother      Diabetes Maternal Grandmother         Past Social History      Social History     Socioeconomic  "History    Marital status: Single   Tobacco Use    Smoking status: Former     Types: Cigars    Smokeless tobacco: Never   Substance and Sexual Activity    Alcohol use: Not Currently    Drug use: Not Currently    Sexual activity: Yes       Current Medications     Outpatient Medications Marked as Taking for the 5/8/24 encounter (Office Visit) with Susan Cheney FNP   Medication Sig Dispense Refill    ondansetron (ZOFRAN-ODT) 4 MG TbDL Take 1 tablet (4 mg total) by mouth every 8 (eight) hours as needed (N/V). 30 tablet 2        I have reviewed the current medications, allergies, vital signs, past medical and surgical history, family medical history, and social history for this encounter and agree with all findings.    OBJECTIVE    Physical Exam    /89   Pulse 70   Ht 5' 2" (1.575 m)   Wt 60.3 kg (133 lb)   LMP 04/23/2024 (Exact Date)   BMI 24.33 kg/m²   GEN: Well appearing, cooperative, NAD  NECK: Supple, no LAD  CV: Normal rate  RESP: Unlabored  ABD: ND, no guarding  EXT: No clubbing, cyanosis, or edema  SKIN: Warm and dry  NEURO: AAO x4.     LABS    CBC (with or without Differential):   Lab Results   Component Value Date    WBC 6.24 04/06/2024    HGB 13.5 04/06/2024    HCT 38.7 04/06/2024    MCV 94.9 04/06/2024    MCH 33.1 (H) 04/06/2024    MCHC 34.9 04/06/2024    RDW 11.0 (L) 04/06/2024     04/06/2024    MPV 9.3 (L) 04/06/2024    NEUTOPHILPCT 60.1 04/06/2024    DIFFTYPE Auto 04/06/2024     BMP/CMP:   Lab Results   Component Value Date     04/06/2024    K 3.8 04/06/2024     (H) 04/06/2024    CO2 24 04/06/2024    BUN 12 04/06/2024    CREATININE 0.68 04/06/2024    GLU 82 04/06/2024    CALCIUM 9.5 04/06/2024    ALBUMIN 4.1 04/06/2024    AST 25 04/06/2024    ALT 19 04/06/2024    ALKPHOS 62 04/06/2024        IMAGING  CT abdomen pelvis with IV contrast 04/2024  - No acute abdominal process is identified     Ultrasound abdomen limited gallbladder 04/2024  - Small volume sludge/stones " demonstrated within the gallbladder lumen. No abnormal gallbladder wall thickening or distention     Xray Abdomen AP   - Nonspecific supine exam of the abdomen     ASSESSMENT  Kavon Denise is a 24 y.o. AAF with no significant medical history who is referred for nausea and vomiting.    1. Calculus of gallbladder without cholecystitis without obstruction    2. Abdominal pain, unspecified abdominal location    3. Vomiting, unspecified vomiting type, unspecified whether nausea present           PLAN    - refer to general surgery for cholelithiasis with abdominal pain  - discussed avoiding marijuana as CHS could be a contributing factor  There are no Patient Instructions on file for this visit.      Orders Placed This Encounter   Procedures    Ambulatory referral/consult to General Surgery     Standing Status:   Future     Standing Expiration Date:   6/8/2025     Referral Priority:   Routine     Referral Type:   Consultation     Referral Reason:   Specialty Services Required     Referred to Provider:   Zhen Higuera MD     Requested Specialty:   General Surgery     Number of Visits Requested:   1         The risks and benefits of my recommendations, as well as other treatment options were discussed with the patient today. All questions were answered.    30 minutes of total time spent on the encounter, which includes face to face time and non-face to face time preparing to see the patient (eg, review of tests), obtaining and/or reviewing separately obtained history, documenting clinical information in the electronic or other health record, Independently interpreting results (not separately reported) and communicating results to the patient/family/caregiver, or care coordination (not separately reported).        CANDELARIO JacksonP/ACNP  [facility]

## 2024-10-01 ENCOUNTER — HOSPITAL ENCOUNTER (EMERGENCY)
Facility: HOSPITAL | Age: 24
Discharge: HOME | End: 2024-10-02
Attending: EMERGENCY MEDICINE

## 2024-10-01 DIAGNOSIS — N39.0 URINARY TRACT INFECTION WITHOUT HEMATURIA, SITE UNSPECIFIED: Primary | ICD-10-CM

## 2024-10-01 PROCEDURE — 87077 CULTURE AEROBIC IDENTIFY: CPT | Performed by: EMERGENCY MEDICINE

## 2024-10-01 PROCEDURE — 36415 COLL VENOUS BLD VENIPUNCTURE: CPT | Performed by: EMERGENCY MEDICINE

## 2024-10-01 PROCEDURE — 81001 URINALYSIS AUTO W/SCOPE: CPT | Performed by: EMERGENCY MEDICINE

## 2024-10-01 PROCEDURE — 83690 ASSAY OF LIPASE: CPT | Performed by: EMERGENCY MEDICINE

## 2024-10-01 PROCEDURE — 81025 URINE PREGNANCY TEST: CPT | Performed by: EMERGENCY MEDICINE

## 2024-10-01 PROCEDURE — 81003 URINALYSIS AUTO W/O SCOPE: CPT | Performed by: EMERGENCY MEDICINE

## 2024-10-01 PROCEDURE — 99284 EMERGENCY DEPT VISIT MOD MDM: CPT

## 2024-10-01 PROCEDURE — 87086 URINE CULTURE/COLONY COUNT: CPT | Performed by: EMERGENCY MEDICINE

## 2024-10-01 PROCEDURE — 85025 COMPLETE CBC W/AUTO DIFF WBC: CPT | Performed by: EMERGENCY MEDICINE

## 2024-10-01 PROCEDURE — 80053 COMPREHEN METABOLIC PANEL: CPT | Performed by: EMERGENCY MEDICINE

## 2024-10-02 VITALS
BODY MASS INDEX: 25.03 KG/M2 | RESPIRATION RATE: 18 BRPM | TEMPERATURE: 100 F | DIASTOLIC BLOOD PRESSURE: 74 MMHG | WEIGHT: 136 LBS | OXYGEN SATURATION: 98 % | HEIGHT: 62 IN | HEART RATE: 98 BPM | SYSTOLIC BLOOD PRESSURE: 120 MMHG

## 2024-10-02 PROBLEM — N39.0 URINARY TRACT INFECTION WITHOUT HEMATURIA: Status: ACTIVE | Noted: 2024-10-02

## 2024-10-02 LAB
ALBUMIN SERPL BCP-MCNC: 4 G/DL (ref 3.5–5)
ALBUMIN/GLOB SERPL: 1.1 {RATIO}
ALP SERPL-CCNC: 59 U/L (ref 37–98)
ALT SERPL W P-5'-P-CCNC: 14 U/L (ref 13–56)
ANION GAP SERPL CALCULATED.3IONS-SCNC: 10 MMOL/L (ref 7–16)
AST SERPL W P-5'-P-CCNC: 8 U/L (ref 15–37)
B-HCG UR QL: NEGATIVE
BACTERIA #/AREA URNS HPF: ABNORMAL /HPF
BASOPHILS # BLD AUTO: 0.03 K/UL (ref 0–0.2)
BASOPHILS NFR BLD AUTO: 0.3 % (ref 0–1)
BILIRUB SERPL-MCNC: 0.6 MG/DL (ref ?–1.2)
BILIRUB UR QL STRIP: NEGATIVE
BUN SERPL-MCNC: 8 MG/DL (ref 7–18)
BUN/CREAT SERPL: 10 (ref 6–20)
CALCIUM SERPL-MCNC: 9.4 MG/DL (ref 8.5–10.1)
CHLORIDE SERPL-SCNC: 110 MMOL/L (ref 98–107)
CLARITY UR: CLEAR
CO2 SERPL-SCNC: 24 MMOL/L (ref 21–32)
COLOR UR: ABNORMAL
CREAT SERPL-MCNC: 0.79 MG/DL (ref 0.55–1.02)
CTP QC/QA: YES
DIFFERENTIAL METHOD BLD: ABNORMAL
EGFR (NO RACE VARIABLE) (RUSH/TITUS): 107 ML/MIN/1.73M2
EOSINOPHIL # BLD AUTO: 0.02 K/UL (ref 0–0.5)
EOSINOPHIL NFR BLD AUTO: 0.2 % (ref 1–4)
ERYTHROCYTE [DISTWIDTH] IN BLOOD BY AUTOMATED COUNT: 10.7 % (ref 11.5–14.5)
GLOBULIN SER-MCNC: 3.8 G/DL (ref 2–4)
GLUCOSE SERPL-MCNC: 87 MG/DL (ref 74–106)
GLUCOSE UR STRIP-MCNC: NORMAL MG/DL
HCT VFR BLD AUTO: 36.7 % (ref 38–47)
HGB BLD-MCNC: 12.7 G/DL (ref 12–16)
IMM GRANULOCYTES # BLD AUTO: 0.03 K/UL (ref 0–0.04)
IMM GRANULOCYTES NFR BLD: 0.3 % (ref 0–0.4)
KETONES UR STRIP-SCNC: ABNORMAL MG/DL
LEUKOCYTE ESTERASE UR QL STRIP: ABNORMAL
LIPASE SERPL-CCNC: <19 U/L (ref 16–77)
LYMPHOCYTES # BLD AUTO: 1.31 K/UL (ref 1–4.8)
LYMPHOCYTES NFR BLD AUTO: 14.6 % (ref 27–41)
MCH RBC QN AUTO: 32.5 PG (ref 27–31)
MCHC RBC AUTO-ENTMCNC: 34.6 G/DL (ref 32–36)
MCV RBC AUTO: 93.9 FL (ref 80–96)
MONOCYTES # BLD AUTO: 0.58 K/UL (ref 0–0.8)
MONOCYTES NFR BLD AUTO: 6.5 % (ref 2–6)
MPC BLD CALC-MCNC: 9.2 FL (ref 9.4–12.4)
MUCOUS, UA: ABNORMAL /LPF
NEUTROPHILS # BLD AUTO: 7.02 K/UL (ref 1.8–7.7)
NEUTROPHILS NFR BLD AUTO: 78.1 % (ref 53–65)
NITRITE UR QL STRIP: NEGATIVE
NRBC # BLD AUTO: 0 X10E3/UL
NRBC, AUTO (.00): 0 %
PH UR STRIP: 6.5 PH UNITS
PLATELET # BLD AUTO: 300 K/UL (ref 150–400)
POTASSIUM SERPL-SCNC: 3.5 MMOL/L (ref 3.5–5.1)
PROT SERPL-MCNC: 7.8 G/DL (ref 6.4–8.2)
PROT UR QL STRIP: 10
RBC # BLD AUTO: 3.91 M/UL (ref 4.2–5.4)
RBC # UR STRIP: NEGATIVE /UL
RBC #/AREA URNS HPF: 1 /HPF
SODIUM SERPL-SCNC: 140 MMOL/L (ref 136–145)
SP GR UR STRIP: 1.02
SQUAMOUS #/AREA URNS LPF: ABNORMAL /HPF
UROBILINOGEN UR STRIP-ACNC: 2 MG/DL
WBC # BLD AUTO: 8.99 K/UL (ref 4.5–11)
WBC #/AREA URNS HPF: 11 /HPF

## 2024-10-02 PROCEDURE — 25000003 PHARM REV CODE 250: Performed by: EMERGENCY MEDICINE

## 2024-10-02 PROCEDURE — 63600175 PHARM REV CODE 636 W HCPCS: Performed by: EMERGENCY MEDICINE

## 2024-10-02 PROCEDURE — 96372 THER/PROPH/DIAG INJ SC/IM: CPT | Performed by: EMERGENCY MEDICINE

## 2024-10-02 RX ORDER — CEFDINIR 300 MG/1
300 CAPSULE ORAL DAILY
Qty: 7 CAPSULE | Refills: 0 | Status: SHIPPED | OUTPATIENT
Start: 2024-10-02 | End: 2024-10-09

## 2024-10-02 RX ORDER — CEFTRIAXONE 1 G/1
1 INJECTION, POWDER, FOR SOLUTION INTRAMUSCULAR; INTRAVENOUS
Status: COMPLETED | OUTPATIENT
Start: 2024-10-02 | End: 2024-10-02

## 2024-10-02 RX ORDER — IBUPROFEN 800 MG/1
800 TABLET ORAL
Status: COMPLETED | OUTPATIENT
Start: 2024-10-02 | End: 2024-10-02

## 2024-10-02 RX ORDER — ONDANSETRON 4 MG/1
4 TABLET, FILM COATED ORAL EVERY 6 HOURS
Qty: 12 TABLET | Refills: 0 | Status: SHIPPED | OUTPATIENT
Start: 2024-10-02

## 2024-10-02 RX ADMIN — CEFTRIAXONE SODIUM 1 G: 1 INJECTION, POWDER, FOR SOLUTION INTRAMUSCULAR; INTRAVENOUS at 12:10

## 2024-10-02 RX ADMIN — IBUPROFEN 800 MG: 800 TABLET, FILM COATED ORAL at 12:10

## 2024-10-02 NOTE — ED PROVIDER NOTES
Encounter Date: 10/1/2024    SCRIBE #1 NOTE: I, Soila Pendleton, am scribing for, and in the presence of,  Angelo Finch MD. I have scribed the entire note.       History     Chief Complaint   Patient presents with    Abdominal Pain    Nausea    Vomiting     This is a 23 y/o female,who presents to the ED with complaints of vomiting which started earlier tonight. She denies a fever, abdominal pain, nausea or diarrhea. She states her last known menstrual period was 09/27/2024. There are no other complaints/pain in the ED at this time. She has a known hx of ovarian cyst. She has had a right ovarian cyst removed. She is a former smoker.     The history is provided by the patient.     Review of patient's allergies indicates:  No Known Allergies  Past Medical History:   Diagnosis Date    Ovarian cyst      Past Surgical History:   Procedure Laterality Date    OVARIAN CYST REMOVAL Right      Family History   Problem Relation Name Age of Onset    Hypertension Mother      Hypertension Maternal Grandmother      Diabetes Maternal Grandmother       Social History     Tobacco Use    Smoking status: Former     Types: Cigars    Smokeless tobacco: Never   Substance Use Topics    Alcohol use: Not Currently    Drug use: Not Currently     Review of Systems   Constitutional:  Negative for fever.   Gastrointestinal:  Positive for vomiting. Negative for abdominal pain and diarrhea.   All other systems reviewed and are negative.      Physical Exam     Initial Vitals [10/01/24 2045]   BP Pulse Resp Temp SpO2   124/76 106 18 99.9 °F (37.7 °C) 98 %      MAP       --         Physical Exam    Nursing note and vitals reviewed.  Constitutional: She appears well-developed and well-nourished.   HENT:   Head: Normocephalic and atraumatic.   Eyes: Conjunctivae and EOM are normal. Pupils are equal, round, and reactive to light.   Neck: Neck supple.   Normal range of motion.  Cardiovascular:  Normal rate, regular rhythm, normal heart sounds and  intact distal pulses.           Pulmonary/Chest: Breath sounds normal.   Abdominal: Abdomen is soft. Bowel sounds are normal.   Musculoskeletal:         General: Normal range of motion.      Cervical back: Normal range of motion and neck supple.     Neurological: She is alert and oriented to person, place, and time. She has normal strength.   Skin: Skin is warm and dry. Capillary refill takes less than 2 seconds.   Psychiatric: She has a normal mood and affect. Thought content normal.         ED Course   Procedures  Labs Reviewed   CULTURE, URINE - Abnormal       Result Value    Culture, Urine >100,000 Escherichia coli (*)    COMPREHENSIVE METABOLIC PANEL - Abnormal    Sodium 140      Potassium 3.5      Chloride 110 (*)     CO2 24      Anion Gap 10      Glucose 87      BUN 8      Creatinine 0.79      BUN/Creatinine Ratio 10      Calcium 9.4      Total Protein 7.8      Albumin 4.0      Globulin 3.8      A/G Ratio 1.1      Bilirubin, Total 0.6      Alk Phos 59      ALT 14      AST 8 (*)     eGFR 107     URINALYSIS, REFLEX TO URINE CULTURE - Abnormal    Color, UA Light-Yellow      Clarity, UA Clear      pH, UA 6.5      Leukocytes, UA Trace (*)     Nitrites, UA Negative      Protein, UA 10 (*)     Glucose, UA Normal      Ketones, UA Trace      Urobilinogen, UA 2 (*)     Bilirubin, UA Negative      Blood, UA Negative      Specific Gravity, UA 1.023     CBC WITH DIFFERENTIAL - Abnormal    WBC 8.99      RBC 3.91 (*)     Hemoglobin 12.7      Hematocrit 36.7 (*)     MCV 93.9      MCH 32.5 (*)     MCHC 34.6      RDW 10.7 (*)     Platelet Count 300      MPV 9.2 (*)     Neutrophils % 78.1 (*)     Lymphocytes % 14.6 (*)     Monocytes % 6.5 (*)     Eosinophils % 0.2 (*)     Basophils % 0.3      Immature Granulocytes % 0.3      nRBC, Auto 0.0      Neutrophils, Abs 7.02      Lymphocytes, Absolute 1.31      Monocytes, Absolute 0.58      Eosinophils, Absolute 0.02      Basophils, Absolute 0.03      Immature Granulocytes, Absolute  0.03      nRBC, Absolute 0.00      Diff Type Auto     URINALYSIS, MICROSCOPIC - Abnormal    WBC, UA 11 (*)     RBC, UA 1      Bacteria, UA Few (*)     Squamous Epithelial Cells, UA Occasional (*)     Mucous Occasional (*)    LIPASE - Normal    Lipase <19     CBC W/ AUTO DIFFERENTIAL    Narrative:     The following orders were created for panel order CBC auto differential.  Procedure                               Abnormality         Status                     ---------                               -----------         ------                     CBC with Differential[[id number]]       Abnormal            Final result                 Please view results for these tests on the individual orders.   POCT URINE PREGNANCY    POC Preg Test, Ur Negative       Acceptable Yes            Imaging Results    None          Medications   ibuprofen tablet 800 mg (800 mg Oral Given 10/2/24 0025)   cefTRIAXone injection 1 g (1 g Intramuscular Given 10/2/24 0054)     Medical Decision Making  This is a 25 y/o female,who presents to the ED with complaints of vomiting which started earlier tonight. She denies a fever, abdominal pain, nausea or diarrhea. She states her last known menstrual period was 09/27/2024. There are no other complaints/pain in the ED at this time. She has a known hx of ovarian cyst. She has had a right ovarian cyst removed. She is a former smoker.   OUTPATIENT ABX.              Amount and/or Complexity of Data Reviewed  Labs: ordered. Decision-making details documented in ED Course.    Risk  Prescription drug management.              Attending Attestation:           Physician Attestation for Scribe:  Physician Attestation Statement for Scribe #1: I, Angelo Finch MD, reviewed documentation, as scribed by Soila Pendleton in my presence, and it is both accurate and complete.                                    Clinical Impression:  Final diagnoses:  [N39.0] Urinary tract infection without hematuria,  site unspecified (Primary)          ED Disposition Condition    Discharge Stable          ED Prescriptions       Medication Sig Dispense Start Date End Date Auth. Provider    cefdinir (OMNICEF) 300 MG capsule () Take 1 capsule (300 mg total) by mouth once daily. for 7 days 7 capsule 10/2/2024 10/9/2024 Angelo Finch MD    ondansetron (ZOFRAN) 4 MG tablet Take 1 tablet (4 mg total) by mouth every 6 (six) hours. 12 tablet 10/2/2024 -- Angelo Finch MD          Follow-up Information       Follow up With Specialties Details Why Contact Info    [facility] - Emergency Department Emergency Medicine  As needed [address] [place] [id number] [phone]             Angelo Finch MD  10/16/24 7401

## 2024-10-02 NOTE — DISCHARGE INSTRUCTIONS
DRINK PLENTY OF FLUIDS.  TAKE ANTIBIOTIC AND NAUSEA MEDICATION AS DIRECTED.  FOLLOW UP IF SYMPTOMS PERSIST OR WORSEN OR OTHERWISE AS NEEDED.

## 2024-10-03 LAB — UA COMPLETE W REFLEX CULTURE PNL UR: ABNORMAL

## 2024-10-07 NOTE — Clinical Note
Diagnosis: Tubo-ovarian abscess [[id number]]   Admitting Provider:: LEROY PORTILLO [[id number]]   Future Attending Provider: LEROY PORTILLO [[id number]]   Reason for IP Medical Treatment  (Clinical interventions that can only be accomplished in the IP setting? ) :: treatment   I certify that Inpatient services for greater than or equal to 2 midnights are medically necessary:: Yes   Plans for Post-Acute care--if anticipated (pick the single best option):: A. No post acute care anticipated at this time  
No

## 2024-11-11 ENCOUNTER — HOSPITAL ENCOUNTER (EMERGENCY)
Facility: HOSPITAL | Age: 24
Discharge: HOME | End: 2024-11-11
Payer: MEDICAID

## 2024-11-11 VITALS
BODY MASS INDEX: 25.03 KG/M2 | HEIGHT: 62 IN | RESPIRATION RATE: 18 BRPM | SYSTOLIC BLOOD PRESSURE: 135 MMHG | TEMPERATURE: 98 F | DIASTOLIC BLOOD PRESSURE: 101 MMHG | HEART RATE: 88 BPM | OXYGEN SATURATION: 98 % | WEIGHT: 136 LBS

## 2024-11-11 DIAGNOSIS — K08.89 PAIN, DENTAL: Primary | ICD-10-CM

## 2024-11-11 PROCEDURE — 99283 EMERGENCY DEPT VISIT LOW MDM: CPT

## 2024-11-11 RX ORDER — CLINDAMYCIN HYDROCHLORIDE 150 MG/1
300 CAPSULE ORAL EVERY 8 HOURS
Qty: 60 CAPSULE | Refills: 0 | Status: SHIPPED | OUTPATIENT
Start: 2024-11-11 | End: 2024-11-21

## 2024-11-11 NOTE — DISCHARGE INSTRUCTIONS
Follow up with your dentist tomorrow. Return to the emergency department for any increase in symptoms or for any other new or worrisome symptoms.

## 2024-11-11 NOTE — ED PROVIDER NOTES
Encounter Date: 11/11/2024       History     Chief Complaint   Patient presents with    Dental Pain     24-year-old female presents to the emergency department to be evaluated for dental pain.  She said that she has had dental pain for the last several days and has been taking some of her friends amoxicillin.  She has had no improvement.    The history is provided by the patient.   Dental Pain  The primary symptoms include mouth pain. Primary symptoms do not include dental injury, oral bleeding, oral lesions, headaches, fever, sore throat or angioedema.     Review of patient's allergies indicates:  No Known Allergies  Past Medical History:   Diagnosis Date    Ovarian cyst      Past Surgical History:   Procedure Laterality Date    OVARIAN CYST REMOVAL Right      Family History   Problem Relation Name Age of Onset    Hypertension Mother      Hypertension Maternal Grandmother      Diabetes Maternal Grandmother       Social History     Tobacco Use    Smoking status: Former     Types: Cigars    Smokeless tobacco: Never   Substance Use Topics    Alcohol use: Not Currently    Drug use: Not Currently     Review of Systems   Constitutional:  Negative for fever.   HENT:  Positive for dental problem. Negative for sore throat.    Neurological:  Negative for headaches.   All other systems reviewed and are negative.      Physical Exam     Initial Vitals [11/11/24 1410]   BP Pulse Resp Temp SpO2   (!) 135/101 88 18 98 °F (36.7 °C) 98 %      MAP       --         Physical Exam    Vitals reviewed.  Constitutional: She appears well-developed and well-nourished.   HENT: Mouth/Throat:       Neck: Neck supple.   Cardiovascular:  Normal rate and regular rhythm.           Pulmonary/Chest: Breath sounds normal.   Musculoskeletal:         General: Normal range of motion.      Cervical back: Neck supple.     Neurological: She is alert and oriented to person, place, and time. She has normal strength. GCS score is 15. GCS eye subscore is 4. GCS  verbal subscore is 5. GCS motor subscore is 6.   Skin: Skin is warm and dry. Capillary refill takes less than 2 seconds.   Psychiatric: She has a normal mood and affect.         Medical Screening Exam   See Full Note    ED Course   Procedures  Labs Reviewed - No data to display       Imaging Results    None          Medications - No data to display  Medical Decision Making  24-year-old female presents to the emergency department to be evaluated for dental pain.  She said that she has had dental pain for the last several days and has been taking some of her friends amoxicillin.  She has had no improvement.  Patient refused drainage of the dental abscess.  She said that she will call her dentist tomorrow for an appointment  Diagnosis: Dental pain  Prescribed clindamycin    Risk  Prescription drug management.                                      Clinical Impression:   Final diagnoses:  [K08.89] Pain, dental (Primary)        ED Disposition Condition    Discharge Stable          ED Prescriptions       Medication Sig Dispense Start Date End Date Auth. Provider    clindamycin (CLEOCIN) 150 MG capsule Take 2 capsules (300 mg total) by mouth every 8 (eight) hours. for 10 days 60 capsule 11/11/2024 11/21/2024 Ermelinda Keller FNP          Follow-up Information    None          Ermelinda Keller FNP  11/11/24 9111

## 2024-11-15 ENCOUNTER — OFFICE VISIT (OUTPATIENT)
Facility: CLINIC | Age: 24
End: 2024-11-15
Payer: MEDICAID

## 2024-11-15 VITALS
WEIGHT: 131 LBS | DIASTOLIC BLOOD PRESSURE: 92 MMHG | RESPIRATION RATE: 18 BRPM | SYSTOLIC BLOOD PRESSURE: 134 MMHG | BODY MASS INDEX: 24.11 KG/M2 | OXYGEN SATURATION: 97 % | HEIGHT: 62 IN | HEART RATE: 75 BPM | TEMPERATURE: 99 F

## 2024-11-15 DIAGNOSIS — Z12.4 ENCOUNTER FOR PAPANICOLAOU SMEAR FOR CERVICAL CANCER SCREENING: ICD-10-CM

## 2024-11-15 DIAGNOSIS — Z11.3 ENCOUNTER FOR SPECIAL SCREENING EXAMINATION FOR INFECTION WITH PREDOMINANTLY SEXUAL MODE OF TRANSMISSION: ICD-10-CM

## 2024-11-15 DIAGNOSIS — Z01.419 WOMEN'S ANNUAL ROUTINE GYNECOLOGICAL EXAMINATION: Primary | ICD-10-CM

## 2024-11-15 DIAGNOSIS — Z72.51 HIGH RISK HETEROSEXUAL BEHAVIOR: ICD-10-CM

## 2024-11-15 LAB
CANDIDA SPECIES: POSITIVE
CHLAMYDIA BY PCR: NEGATIVE
GARDNERELLA: NEGATIVE
HIV 1+O+2 AB SERPL QL: NORMAL
N. GONORRHOEAE (GC) BY PCR: NEGATIVE
SYPHILIS AB INTERPRETATION: REACTIVE
TRICHOMONAS: NEGATIVE

## 2024-11-15 PROCEDURE — 87660 TRICHOMONAS VAGIN DIR PROBE: CPT | Mod: ,,, | Performed by: CLINICAL MEDICAL LABORATORY

## 2024-11-15 PROCEDURE — 88142 CYTOPATH C/V THIN LAYER: CPT | Mod: TC,GCY | Performed by: ADVANCED PRACTICE MIDWIFE

## 2024-11-15 PROCEDURE — 86592 SYPHILIS TEST NON-TREP QUAL: CPT | Mod: ,,, | Performed by: CLINICAL MEDICAL LABORATORY

## 2024-11-15 PROCEDURE — 3008F BODY MASS INDEX DOCD: CPT | Mod: CPTII,,, | Performed by: ADVANCED PRACTICE MIDWIFE

## 2024-11-15 PROCEDURE — 87389 HIV-1 AG W/HIV-1&-2 AB AG IA: CPT | Mod: ,,, | Performed by: CLINICAL MEDICAL LABORATORY

## 2024-11-15 PROCEDURE — 3075F SYST BP GE 130 - 139MM HG: CPT | Mod: CPTII,,, | Performed by: ADVANCED PRACTICE MIDWIFE

## 2024-11-15 PROCEDURE — 1159F MED LIST DOCD IN RCRD: CPT | Mod: CPTII,,, | Performed by: ADVANCED PRACTICE MIDWIFE

## 2024-11-15 PROCEDURE — 87510 GARDNER VAG DNA DIR PROBE: CPT | Mod: ,,, | Performed by: CLINICAL MEDICAL LABORATORY

## 2024-11-15 PROCEDURE — 87591 N.GONORRHOEAE DNA AMP PROB: CPT | Mod: ,,, | Performed by: CLINICAL MEDICAL LABORATORY

## 2024-11-15 PROCEDURE — 99395 PREV VISIT EST AGE 18-39: CPT | Mod: ,,, | Performed by: ADVANCED PRACTICE MIDWIFE

## 2024-11-15 PROCEDURE — 87480 CANDIDA DNA DIR PROBE: CPT | Mod: ,,, | Performed by: CLINICAL MEDICAL LABORATORY

## 2024-11-15 PROCEDURE — 3080F DIAST BP >= 90 MM HG: CPT | Mod: CPTII,,, | Performed by: ADVANCED PRACTICE MIDWIFE

## 2024-11-15 PROCEDURE — 87491 CHLMYD TRACH DNA AMP PROBE: CPT | Mod: ,,, | Performed by: CLINICAL MEDICAL LABORATORY

## 2024-11-18 LAB — RPR SER-TITR: ABNORMAL {TITER}

## 2024-11-19 ENCOUNTER — PATIENT MESSAGE (OUTPATIENT)
Dept: RESEARCH | Facility: HOSPITAL | Age: 24
End: 2024-11-19

## 2024-11-22 ENCOUNTER — OFFICE VISIT (OUTPATIENT)
Facility: CLINIC | Age: 24
End: 2024-11-22
Payer: MEDICAID

## 2024-11-22 VITALS
RESPIRATION RATE: 18 BRPM | TEMPERATURE: 98 F | SYSTOLIC BLOOD PRESSURE: 125 MMHG | WEIGHT: 131 LBS | BODY MASS INDEX: 24.11 KG/M2 | DIASTOLIC BLOOD PRESSURE: 69 MMHG | HEIGHT: 62 IN | HEART RATE: 73 BPM | OXYGEN SATURATION: 99 %

## 2024-11-22 DIAGNOSIS — A53.9 SYPHILIS: Primary | ICD-10-CM

## 2024-11-22 DIAGNOSIS — B37.31 YEAST VAGINITIS: ICD-10-CM

## 2024-11-22 PROCEDURE — 3074F SYST BP LT 130 MM HG: CPT | Mod: CPTII,,, | Performed by: ADVANCED PRACTICE MIDWIFE

## 2024-11-22 PROCEDURE — 99213 OFFICE O/P EST LOW 20 MIN: CPT | Mod: 25,,, | Performed by: ADVANCED PRACTICE MIDWIFE

## 2024-11-22 PROCEDURE — 3008F BODY MASS INDEX DOCD: CPT | Mod: CPTII,,, | Performed by: ADVANCED PRACTICE MIDWIFE

## 2024-11-22 PROCEDURE — 3078F DIAST BP <80 MM HG: CPT | Mod: CPTII,,, | Performed by: ADVANCED PRACTICE MIDWIFE

## 2024-11-22 PROCEDURE — 96372 THER/PROPH/DIAG INJ SC/IM: CPT | Mod: ,,, | Performed by: ADVANCED PRACTICE MIDWIFE

## 2024-11-22 RX ORDER — FLUCONAZOLE 150 MG/1
150 TABLET ORAL
Qty: 4 TABLET | Refills: 0 | Status: SHIPPED | OUTPATIENT
Start: 2024-11-22 | End: 2024-12-14

## 2024-11-23 NOTE — PROGRESS NOTES
"Patient ID:  Kavon Denise is a 24 y.o. female      Chief Complaint:   Chief Complaint   Patient presents with    Follow-up     Follow up        HPI:  Luis M is in for lab review. Has seen results of RPR on My Chart. Discussed results. Instructed RPR does not indicate reinfection. Expresses concern and desires treatment stating partner was recently given antibiotic injections for syphilis reinfection.   LMP: Patient's last menstrual period was 10/22/2024.   Sexually active:  yes, unprotected, 1 partner  Contraceptive: none      Past Medical History:   Diagnosis Date    Ovarian cyst      Past Surgical History:   Procedure Laterality Date    OVARIAN CYST REMOVAL Right        OB History          0    Para   0    Term   0       0    AB   0    Living   0         SAB   0    IAB   0    Ectopic   0    Multiple   0    Live Births   0                 /69 (BP Location: Right arm, Patient Position: Sitting)   Pulse 73   Temp 97.6 °F (36.4 °C) (Oral)   Resp 18   Ht 5' 2" (1.575 m)   Wt 59.4 kg (131 lb)   LMP 10/22/2024   SpO2 99%   BMI 23.96 kg/m²   Wt Readings from Last 3 Encounters:   24 59.4 kg (131 lb)   11/15/24 59.4 kg (131 lb)   24 61.7 kg (136 lb)          ROS:  Review of Systems   Constitutional: Negative.    Eyes: Negative.    Respiratory: Negative.     Cardiovascular: Negative.    Gastrointestinal: Negative.    Genitourinary: Negative.    Musculoskeletal: Negative.    Neurological: Negative.    Psychiatric/Behavioral: Negative.            PHYSICAL EXAM:  Physical Exam  Constitutional:       Appearance: Normal appearance. She is normal weight.   Eyes:      Extraocular Movements: Extraocular movements intact.   Cardiovascular:      Rate and Rhythm: Normal rate.      Pulses: Normal pulses.   Pulmonary:      Effort: Pulmonary effort is normal.   Musculoskeletal:         General: Normal range of motion.      Cervical back: Normal range of motion.   Skin:     General: Skin is " warm and dry.   Neurological:      Mental Status: She is alert and oriented to person, place, and time.   Psychiatric:         Mood and Affect: Mood normal.         Behavior: Behavior normal.         Thought Content: Thought content normal.         Judgment: Judgment normal.          Assessment:  Kavon was seen today for follow-up.    Diagnoses and all orders for this visit:    Syphilis  -     penicillin G benzathine (BICILLIN LA) injection 2.4 Million Units    BMI 23.0-23.9, adult    Yeast vaginitis  -     fluconazole (DIFLUCAN) 150 MG Tab; Take 1 tablet (150 mg total) by mouth every 7 days. for 4 doses          ICD-10-CM ICD-9-CM    1. Syphilis  A53.9 097.9 penicillin G benzathine (BICILLIN LA) injection 2.4 Million Units      2. BMI 23.0-23.9, adult  Z68.23 V85.1       3. Yeast vaginitis  B37.31 112.1 fluconazole (DIFLUCAN) 150 MG Tab          Plan:  Reviewed and discussed RPR results, explained RPR may remain at 1:2   PCN G 2.4 million units given IM as treatment  Rx Diflucan sent to treat yeast infection  Encouraged use of condoms to decrease STD exposure risk    Follow up in about 6 months (around 5/22/2025) for follow up testing.

## 2024-11-25 NOTE — PROGRESS NOTES
"  Patient ID: Kavon Denise is a 24 y.o. female     Chief Complaint:   Chief Complaint   Patient presents with    Gynecologic Exam     Not Taking Birth Control  Sexually Active  Wants STD Testing   Last Cycle 10/22/2024  Last PAP 2021    Wants a shot for her Syphilis states that she believes she is due for another one, and that when her boyfriend went to the Dr. They told him that he would need 2 more shots.        HPI: Kavon Denise is a 24 y.o. female who presents for well woman exam with Pap.  Last Pap NILM 2021. Has hx positive syphilis with treatment. Requesting antibiotic injection states boyfriend was seen recently and treated for reinfection. Currently asymptomatic. Agrees to lab testing.   LMP: Patient's last menstrual period was 10/22/2024.  Sexually active: yes   Contraceptive: none  Mammogram: n/a    Past Medical History:   Diagnosis Date    Ovarian cyst        Past Surgical History:   Procedure Laterality Date    OVARIAN CYST REMOVAL Right        Social History     Socioeconomic History    Marital status: Single   Tobacco Use    Smoking status: Former     Types: Cigars    Smokeless tobacco: Never   Substance and Sexual Activity    Alcohol use: Not Currently    Drug use: Not Currently    Sexual activity: Yes       Family History   Problem Relation Name Age of Onset    Hypertension Mother      Hypertension Maternal Grandmother      Diabetes Maternal Grandmother         OB History          0    Para   0    Term   0       0    AB   0    Living   0         SAB   0    IAB   0    Ectopic   0    Multiple   0    Live Births   0                 BP (!) 134/92 (BP Location: Left arm, Patient Position: Sitting)   Pulse 75   Temp 99.2 °F (37.3 °C) (Oral)   Resp 18   Ht 5' 2" (1.575 m)   Wt 59.4 kg (131 lb)   LMP 10/22/2024   SpO2 97%   BMI 23.96 kg/m²     Wt Readings from Last 3 Encounters:   24 59.4 kg (131 lb)   11/15/24 59.4 kg (131 lb)   24 61.7 kg (136 lb)    "     Review of Systems   Constitutional: Negative.    Eyes: Negative.    Respiratory: Negative.     Cardiovascular: Negative.    Gastrointestinal: Negative.    Endocrine: Negative.    Genitourinary: Negative.    Musculoskeletal: Negative.    Integumentary:  Negative.   Neurological: Negative.    Hematological: Negative.    Psychiatric/Behavioral: Negative.     Breast: negative.         Physical Exam   GENERAL: Well-developed, well-nourished female in no acute distress  NECK: Supple with full range of motion. No adenopathy, masses, or thyromegaly  SKIN: Normal to inspection with no rashes, lesions, or ulcers  LUNGS: Clear bilaterally with no rales, rhonchi, or wheezes   CARDIOVASCULAR AUSCULTATION: Normal heart rate and rhythm  BREASTS: No masses, lumps, discharge, tenderness, or skin changes  LYMPH NODES: No axillary, or inguinal adenopathy  ABDOMEN: Soft, non tender, without masses. No palpable hernia or  hepatosplenomegaly  VULVA: General appearance normal; external genitalia without lesions or rash  URETHRA: Normal size and location with no lesions or prolapse  VAGINA: Mucosa normal, no discharge or lesions  CERVIX: Pink without lesions, discharge or tenderness  UTERUS: Regular, mobile, and non tender;  consistency is normal. No evidence of adnexa masses, tenderness, or nodularity  ANUS: No lesions or relaxation.  LOWER EXTREMITIES: No edema or tenderness  PSYCHIATRIC: Patient is oriented to person, place, and time. Mood and affect are normal      Assessment:  Women's annual routine gynecological examination  -     ThinPrep Pap Test; Future; Expected date: 11/15/2024    High risk heterosexual behavior  -     Bacterial Vaginosis; Future; Expected date: 11/15/2024  -     Chlamydia/GC, PCR; Future; Expected date: 11/15/2024  -     HIV 1/2 Ag/Ab (4th Gen); Future; Expected date: 11/15/2024  -     Treponema Pallidum (Syphillis) Antibody; Future; Expected date: 11/15/2024  -     RPR    Encounter for special screening  examination for infection with predominantly sexual mode of transmission  -     Bacterial Vaginosis; Future; Expected date: 11/15/2024  -     Chlamydia/GC, PCR; Future; Expected date: 11/15/2024  -     HIV 1/2 Ag/Ab (4th Gen); Future; Expected date: 11/15/2024  -     Treponema Pallidum (Syphillis) Antibody; Future; Expected date: 11/15/2024  -     RPR    Encounter for Papanicolaou smear for cervical cancer screening  -     ThinPrep Pap Test; Future; Expected date: 11/15/2024    BMI 23.0-23.9, adult          ICD-10-CM ICD-9-CM    1. Women's annual routine gynecological examination  Z01.419 V72.31 ThinPrep Pap Test      ThinPrep Pap Test      2. High risk heterosexual behavior  Z72.51 V69.2 Bacterial Vaginosis      Chlamydia/GC, PCR      HIV 1/2 Ag/Ab (4th Gen)      Treponema Pallidum (Syphillis) Antibody      Bacterial Vaginosis      Chlamydia/GC, PCR      HIV 1/2 Ag/Ab (4th Gen)      Treponema Pallidum (Syphillis) Antibody      RPR      3. Encounter for special screening examination for infection with predominantly sexual mode of transmission  Z11.3 V74.5 Bacterial Vaginosis      Chlamydia/GC, PCR      HIV 1/2 Ag/Ab (4th Gen)      Treponema Pallidum (Syphillis) Antibody      Bacterial Vaginosis      Chlamydia/GC, PCR      HIV 1/2 Ag/Ab (4th Gen)      Treponema Pallidum (Syphillis) Antibody      RPR      4. Encounter for Papanicolaou smear for cervical cancer screening  Z12.4 V76.2 ThinPrep Pap Test      ThinPrep Pap Test      5. BMI 23.0-23.9, adult  Z68.23 V85.1           Plan:  Annual exam completed, Pap and Affirm specimens collected  Urine sent for GC/CT testing  Blood drawn for labs  Will call or send My Chart message after results reviewed  Patient was counseled today on ASCCP Pap guidelines and recommendations for yearly pelvic exams   Encouraged use of condoms during sexual encounters to decrease STD exposure risk    Follow up in about 1 year (around 11/15/2025) for annual gyn  exam.                    Answers submitted by the patient for this visit:  Gynecologic Exam Questionnaire  (Submitted on 2024)  Chief Complaint: Gynecologic exam  genital itching: No  genital lesions: No  genital odor: No  genital rash: No  missed menses: No  pelvic pain: No  vaginal bleeding: No  vaginal discharge: No  Progression since onset: resolved  Pain severity: no pain  Pregnant now?: No  abdominal pain: No  anorexia: No  back pain: No  chills: No  constipation: No  diarrhea: No  discolored urine: No  dysuria: No  fever: No  flank pain: No  frequency: No  headaches: No  hematuria: No  nausea: No  painful intercourse: No  rash: No  urgency: No  vomiting: No  Passing clots?: No  Passing tissue?: No  Sexual activity: sexually active  Partner with STD symptoms: possible  Menstrual history: regular  STD: Yes  abdominal surgery: No   section: No  Ectopic pregnancy: No  herpes simplex: Yes  gynecological surgery: No  menorrhagia: No  metrorrhagia: No  miscarriage: No  ovarian cysts: Yes  perineal abscess: No  terminated pregnancy: No  vaginosis: No

## 2025-01-22 ENCOUNTER — HOSPITAL ENCOUNTER (EMERGENCY)
Facility: HOSPITAL | Age: 25
Discharge: HOME | End: 2025-01-22

## 2025-01-22 VITALS
HEIGHT: 62 IN | TEMPERATURE: 99 F | RESPIRATION RATE: 18 BRPM | HEART RATE: 90 BPM | WEIGHT: 133 LBS | OXYGEN SATURATION: 98 % | SYSTOLIC BLOOD PRESSURE: 139 MMHG | DIASTOLIC BLOOD PRESSURE: 89 MMHG | BODY MASS INDEX: 24.48 KG/M2

## 2025-01-22 DIAGNOSIS — J06.9 UPPER RESPIRATORY TRACT INFECTION, UNSPECIFIED TYPE: Primary | ICD-10-CM

## 2025-01-22 LAB
INFLUENZA A MOLECULAR (OHS): NEGATIVE
INFLUENZA B MOLECULAR (OHS): NEGATIVE
SARS-COV-2 RDRP RESP QL NAA+PROBE: NEGATIVE

## 2025-01-22 PROCEDURE — 87502 INFLUENZA DNA AMP PROBE: CPT

## 2025-01-22 PROCEDURE — 99283 EMERGENCY DEPT VISIT LOW MDM: CPT

## 2025-01-22 PROCEDURE — 87635 SARS-COV-2 COVID-19 AMP PRB: CPT

## 2025-01-22 RX ORDER — OSELTAMIVIR PHOSPHATE 75 MG/1
75 CAPSULE ORAL 2 TIMES DAILY
Qty: 10 CAPSULE | Refills: 0 | Status: SHIPPED | OUTPATIENT
Start: 2025-01-22 | End: 2025-01-27

## 2025-01-23 NOTE — ED PROVIDER NOTES
Encounter Date: 1/22/2025       History     Chief Complaint   Patient presents with    Abdominal Pain    URI     Pov to er - states she went on road trip and now everyone in car has flu - c/o cold s/s x 2 days      25-year old female presents to the emergency department for evaluation of cough, congestion, runny nose and body aches that have been going on for the past day. Reports that she went out of town with a group of people who tested positive for flu and thinks that she may have it as well. Denies chest pain, shortness of breath, fever, chills.    The history is provided by the patient. No  was used.   URI  The primary symptoms include headaches, sore throat and cough. Primary symptoms do not include fever, wheezing, nausea or vomiting. The current episode started yesterday. This is a new problem. The problem has not changed since onset.  The headache is not associated with photophobia, eye pain or neck stiffness.   The sore throat began yesterday. The sore throat is not accompanied by trouble swallowing, drooling, hoarse voice or stridor. The sore throat pain is at a severity of 6/10.   The cough began 2 days ago. The cough is new. The cough is dry and hacking.   The onset of the illness is associated with exposure to sick contacts. Symptoms associated with the illness include sinus pressure, congestion and rhinorrhea. The illness is not associated with chills. The following treatments were addressed: Acetaminophen was not tried. NSAIDs were not tried.     Review of patient's allergies indicates:  No Known Allergies  Past Medical History:   Diagnosis Date    Ovarian cyst      Past Surgical History:   Procedure Laterality Date    OVARIAN CYST REMOVAL Right      Family History   Problem Relation Name Age of Onset    Hypertension Mother      Hypertension Maternal Grandmother      Diabetes Maternal Grandmother       Social History     Tobacco Use    Smoking status: Former     Types: Cigars     Smokeless tobacco: Never   Substance Use Topics    Alcohol use: Not Currently    Drug use: Not Currently     Review of Systems   Constitutional:  Negative for activity change, appetite change, chills and fever.   HENT:  Positive for congestion, rhinorrhea, sinus pressure and sore throat. Negative for drooling, hoarse voice and trouble swallowing.    Eyes:  Negative for photophobia and pain.   Respiratory:  Positive for cough. Negative for chest tightness, shortness of breath, wheezing and stridor.    Cardiovascular:  Negative for chest pain and palpitations.   Gastrointestinal:  Negative for nausea and vomiting.   Genitourinary:  Negative for decreased urine volume and dysuria.   Musculoskeletal:  Negative for neck pain and neck stiffness.   Neurological:  Positive for headaches. Negative for dizziness, tremors, weakness and light-headedness.   Psychiatric/Behavioral:  Negative for confusion.    All other systems reviewed and are negative.      Physical Exam     Initial Vitals [01/22/25 2210]   BP Pulse Resp Temp SpO2   139/89 90 18 98.8 °F (37.1 °C) 98 %      MAP       --         Physical Exam    Vitals reviewed.  Constitutional: She appears well-nourished. No distress.   HENT:   Head: Normocephalic.   Right Ear: Hearing, tympanic membrane and ear canal normal.   Left Ear: Hearing, tympanic membrane and ear canal normal.   Nose: Rhinorrhea present. Right sinus exhibits no frontal sinus tenderness. Left sinus exhibits no frontal sinus tenderness. Mouth/Throat: Uvula is midline, oropharynx is clear and moist and mucous membranes are normal. No uvula swelling. No posterior oropharyngeal edema or posterior oropharyngeal erythema.   Eyes: Conjunctivae are normal. Right eye exhibits no discharge. Left eye exhibits no discharge.   Neck: Neck supple.   Normal range of motion.  Cardiovascular:  Normal rate, regular rhythm and normal heart sounds.           Pulmonary/Chest: Breath sounds normal. No respiratory distress. She  has no wheezes. She has no rhonchi. She exhibits no tenderness.   Abdominal: Abdomen is soft. Bowel sounds are normal. She exhibits no distension. There is no abdominal tenderness. There is no guarding.   Musculoskeletal:         General: No tenderness. Normal range of motion.      Cervical back: Normal range of motion and neck supple.     Lymphadenopathy:     She has no cervical adenopathy.   Neurological: She is alert and oriented to person, place, and time. She has normal strength. No sensory deficit. GCS score is 15. GCS eye subscore is 4. GCS verbal subscore is 5. GCS motor subscore is 6.   Skin: Skin is warm and dry. Capillary refill takes less than 2 seconds.   Psychiatric: She has a normal mood and affect. Her behavior is normal.         Medical Screening Exam   See Full Note    ED Course   Procedures  Labs Reviewed   INFLUENZA A & B BY MOLECULAR - Normal       Result Value    INFLUENZA A MOLECULAR Negative      INFLUENZA B MOLECULAR  Negative     SARS-COV-2 RNA AMPLIFICATION, QUAL - Normal    SARS COV-2 Molecular Negative      Narrative:     Negative SARS-CoV results should not be used as the sole basis for treatment or patient management decisions; negative results should be considered in the context of a patient's recent exposures, history and the presene of clinical signs and symptoms consistent with COVID-19.  Negative results should be treated as presumptive and confirmed by molecular assay, if necessary for patient management.          Imaging Results    None          Medications - No data to display  Medical Decision Making  25-year old female presents to the emergency department for evaluation of cough, congestion, runny nose and body aches that have been going on for the past day. Reports that she went out of town with a group of people who tested positive for flu and thinks that she may have it as well. Denies chest pain, shortness of breath, fever, chills.  Ordered and reviewed viral  swabs  Follow-up and return precautions discussed with patient, who verbalized understanding  Prescription provided  Diagnosis: Upper respiratory tract infection    Risk  Prescription drug management.                                      Clinical Impression:   Final diagnoses:  [J06.9] Upper respiratory tract infection, unspecified type (Primary)        ED Disposition Condition    Discharge Stable          ED Prescriptions       Medication Sig Dispense Start Date End Date Auth. Provider    oseltamivir (TAMIFLU) 75 MG capsule Take 1 capsule (75 mg total) by mouth 2 (two) times daily. for 5 days 10 capsule 1/22/2025 1/27/2025 Anthony Osborn, MIKE          Follow-up Information    None          Anthony Osborn, MIKE  01/23/25 0676

## 2025-01-23 NOTE — DISCHARGE INSTRUCTIONS
Take medication as ordered. Follow up with primary care provider in three days for re-evaluation. Take tylenol and motrin as needed for fever/pain. Drink plenty of fluids. Return to the emergency department for new or worsening symptoms.

## 2025-03-31 ENCOUNTER — HOSPITAL ENCOUNTER (EMERGENCY)
Facility: HOSPITAL | Age: 25
Discharge: HOME | End: 2025-03-31

## 2025-03-31 VITALS
HEART RATE: 83 BPM | DIASTOLIC BLOOD PRESSURE: 95 MMHG | SYSTOLIC BLOOD PRESSURE: 139 MMHG | WEIGHT: 135 LBS | TEMPERATURE: 99 F | HEIGHT: 62 IN | RESPIRATION RATE: 18 BRPM | BODY MASS INDEX: 24.84 KG/M2 | OXYGEN SATURATION: 99 %

## 2025-03-31 DIAGNOSIS — N75.0 BARTHOLIN CYST: Primary | ICD-10-CM

## 2025-03-31 PROCEDURE — 56420 I&D BARTHOLINS GLAND ABSCESS: CPT

## 2025-03-31 PROCEDURE — 99283 EMERGENCY DEPT VISIT LOW MDM: CPT | Mod: 25

## 2025-03-31 PROCEDURE — 63600175 PHARM REV CODE 636 W HCPCS: Performed by: NURSE PRACTITIONER

## 2025-03-31 RX ORDER — LIDOCAINE HYDROCHLORIDE 10 MG/ML
10 INJECTION, SOLUTION INFILTRATION; PERINEURAL
Status: COMPLETED | OUTPATIENT
Start: 2025-03-31 | End: 2025-03-31

## 2025-03-31 RX ORDER — SULFAMETHOXAZOLE AND TRIMETHOPRIM 800; 160 MG/1; MG/1
1 TABLET ORAL 2 TIMES DAILY
Qty: 20 TABLET | Refills: 0 | Status: SHIPPED | OUTPATIENT
Start: 2025-03-31 | End: 2025-04-10

## 2025-03-31 RX ADMIN — LIDOCAINE HYDROCHLORIDE 10 ML: 10 INJECTION, SOLUTION INFILTRATION; PERINEURAL at 01:03

## 2025-03-31 NOTE — ED TRIAGE NOTES
"Presents to ED for complaints of possible labial abscess.  Patient states that she has been on antibiotics per her OB for 1 week and "spot" is still there.  "

## 2025-03-31 NOTE — ED NOTES
In with Anthony Tabares FNP for Incision and Drainage of Right Labial Abscess.  Consent signed prior to procedure after risk and benefits discussed with patient per Anthony PALOMINOP.  Abscess drained per FNP and patient tolerated well.

## 2025-03-31 NOTE — ED PROVIDER NOTES
Encounter Date: 3/31/2025       History     Chief Complaint   Patient presents with    Abscess     Pt presents to ed with c/o having possible vaginal abscess for 1 week      25-year-old female presents to the emergency department to be evaluated for a painful abscess on the labia that began several days ago.  She states she has had this occur approximately 3 years ago and required surgery and an inpatient stay.  She denies any fever, nausea or vomiting.    The history is provided by the patient.   Abscess   This is a new problem. The current episode started several days ago. The problem has been unchanged. The abscess is present on the genitalia. The pain is at a severity of 10/10. The abscess is characterized by painfulness and swelling. Pertinent negatives include no anorexia, no decrease in physical activity, not sleeping less, not drinking less, no fever, no diarrhea, no vomiting, no congestion, no rhinorrhea, no sore throat, no decreased responsiveness and no cough.     Review of patient's allergies indicates:  No Known Allergies  Past Medical History:   Diagnosis Date    Ovarian cyst      Past Surgical History:   Procedure Laterality Date    OVARIAN CYST REMOVAL Right      Family History   Problem Relation Name Age of Onset    Hypertension Mother      Hypertension Maternal Grandmother      Diabetes Maternal Grandmother       Social History[1]  Review of Systems   Constitutional: Negative.  Negative for decreased responsiveness and fever.   HENT:  Negative for congestion, rhinorrhea and sore throat.    Respiratory: Negative.  Negative for cough.    Gastrointestinal:  Negative for abdominal pain, anorexia, constipation, diarrhea, nausea and vomiting.   Genitourinary:  Positive for vaginal pain (bartholin cyst).   Skin:  Positive for wound.   Neurological: Negative.    Hematological: Negative.    Psychiatric/Behavioral: Negative.         Physical Exam     Initial Vitals [03/31/25 1210]   BP Pulse Resp Temp SpO2    (!) 146/101 88 14 99.1 °F (37.3 °C) 99 %      MAP       --         Physical Exam    Vitals reviewed.  Constitutional: She appears well-developed and well-nourished. She is not diaphoretic. No distress.   HENT:   Head: Normocephalic and atraumatic.   Right Ear: External ear normal.   Left Ear: External ear normal.   Nose: Nose normal. Mouth/Throat: Oropharynx is clear and moist. No oropharyngeal exudate.   Eyes: Conjunctivae and EOM are normal. Pupils are equal, round, and reactive to light. Right eye exhibits no discharge. Left eye exhibits no discharge. No scleral icterus.   Neck: Neck supple. No tracheal deviation present.   Normal range of motion.  Cardiovascular:  Normal rate, regular rhythm and intact distal pulses.           Pulmonary/Chest: Breath sounds normal. No stridor. No respiratory distress. She has no wheezes. She has no rhonchi. She has no rales. She exhibits no tenderness.   Abdominal: Abdomen is soft. Bowel sounds are normal. She exhibits no distension. There is no abdominal tenderness. There is no guarding.   Genitourinary: There is tenderness on the right labia. There is no rash or injury on the right labia. There is no rash, tenderness, lesion or injury on the left labia.       Musculoskeletal:         General: No tenderness or edema. Normal range of motion.      Cervical back: Normal range of motion and neck supple.     Lymphadenopathy:     She has no cervical adenopathy.   Neurological: She is alert and oriented to person, place, and time. She has normal strength. No cranial nerve deficit or sensory deficit. GCS score is 15. GCS eye subscore is 4. GCS verbal subscore is 5. GCS motor subscore is 6.   Skin: Skin is warm and dry. Capillary refill takes less than 2 seconds. Abscess (see diagram) noted.   Psychiatric: She has a normal mood and affect. Thought content normal.         Medical Screening Exam   See Full Note    ED Course   I & D - Incision and Drainage    Date/Time: 3/31/2025 2:21  PM  Location procedure was performed: [facility] EMERGENCY DEPARTMENT    Performed by: Anthony Tabares FNP  Authorized by: Anthony Tabares FNP  Consent Done: Yes  Consent: Verbal consent obtained. Written consent obtained  Risks and benefits: risks, benefits and alternatives were discussed  Consent given by: patient  Patient understanding: patient states understanding of the procedure being performed  Patient consent: the patient's understanding of the procedure matches consent given  Procedure consent: procedure consent matches procedure scheduled  Relevant documents: relevant documents present and verified  Patient identity confirmed: , name and verbally with patient  Type: cyst  Body area: anogenital  Location details: Bartholin's gland  Anesthesia: local infiltration    Anesthesia:  Local Anesthetic: lidocaine 1% without epinephrine    Patient sedated: no  Scalpel size: 11  Incision type: single straight  Incision depth: dermal and fascia  Complexity: simple  Drainage: serosanguinous and pus  Drainage amount: moderate  Wound treatment: incision, drainage, wound left open and expression of material  Complications: No  Estimated blood loss (mL): 2  Specimens: No  Implants: No  Patient tolerance: Patient tolerated the procedure well with no immediate complications  Comments: Chaperone and assisted by Nadege Bruce RN  Refer to general surgery     Incision depth: dermal and fascia        Labs Reviewed - No data to display       Imaging Results    None          Medications   LIDOcaine HCL 10 mg/ml (1%) injection 10 mL (10 mLs Other Given 3/31/25 1350)     Medical Decision Making  25-year-old female presents to the emergency department to be evaluated for a painful abscess on the labia that began several days ago.  She states she has had this occur approximately 3 years ago and required surgery and an inpatient stay.  She denies any fever, nausea or vomiting.    Diagnosis: Bartholin cyst  I&D in ED with 1-2mL  serosanguinous drainage   Prescribed Bactrim; encouraged kwasi wash following voiding and bowel movements, and to keep clean and dry.   Referral to general surgery    Risk  Prescription drug management.                                      Clinical Impression:   Final diagnoses:  [N75.0] Bartholin cyst (Primary)        ED Disposition Condition    Discharge Stable          ED Prescriptions       Medication Sig Dispense Start Date End Date Auth. Provider    sulfamethoxazole-trimethoprim 800-160mg (BACTRIM DS) 800-160 mg Tab Take 1 tablet by mouth 2 (two) times daily. for 10 days 20 tablet 3/31/2025 4/10/2025 Anthony Tabares FNP          Follow-up Information    None              [1]   Social History  Tobacco Use    Smoking status: Former     Types: Cigars    Smokeless tobacco: Never   Substance Use Topics    Alcohol use: Not Currently    Drug use: Not Currently        Anthony Tabares FNP  03/31/25 3510

## 2025-03-31 NOTE — DISCHARGE INSTRUCTIONS
Bactrim as prescribed and directed. Outpatient referral to general surgery. Keep incisional area clean and dry, especially following urination and bowel movements. Follow up with your primary care provider in 2 days. Return to the emergency department for any increase in symptoms or for any other new or worrisome symptoms.

## 2025-08-14 ENCOUNTER — OFFICE VISIT (OUTPATIENT)
Facility: CLINIC | Age: 25
End: 2025-08-14
Payer: MEDICAID

## 2025-08-14 VITALS
BODY MASS INDEX: 26.13 KG/M2 | OXYGEN SATURATION: 99 % | HEIGHT: 62 IN | WEIGHT: 142 LBS | DIASTOLIC BLOOD PRESSURE: 89 MMHG | SYSTOLIC BLOOD PRESSURE: 147 MMHG | HEART RATE: 93 BPM | RESPIRATION RATE: 17 BRPM

## 2025-08-14 DIAGNOSIS — Z86.19 HISTORY OF SEXUALLY TRANSMITTED INFECTION: ICD-10-CM

## 2025-08-14 DIAGNOSIS — Z11.3 ENCOUNTER FOR SCREENING FOR INFECTIONS WITH A PREDOMINANTLY SEXUAL MODE OF TRANSMISSION: Primary | ICD-10-CM

## 2025-08-14 DIAGNOSIS — R03.0 ELEVATED BLOOD PRESSURE READING: ICD-10-CM

## 2025-08-14 DIAGNOSIS — E66.3 OVERWEIGHT WITH BODY MASS INDEX (BMI) OF 25 TO 25.9 IN ADULT: ICD-10-CM

## 2025-08-14 DIAGNOSIS — Z72.51 HIGH RISK HETEROSEXUAL BEHAVIOR: ICD-10-CM

## 2025-08-14 LAB
B-HCG UR QL: NEGATIVE
BACTERIAL VAGINOSIS DNA (OHS): POSITIVE
CANDIDA GLABRATA/KRUSEI DNA (OHS): NOT DETECTED
CANDIDA SPECIES DNA (OHS): DETECTED
CTP QC/QA: YES
HIV 1+O+2 AB SERPL QL: NORMAL
TRICHOMONAS VAGINALIS DNA (OHS): NOT DETECTED

## 2025-08-14 PROCEDURE — 3008F BODY MASS INDEX DOCD: CPT | Mod: CPTII,,, | Performed by: ADVANCED PRACTICE MIDWIFE

## 2025-08-14 PROCEDURE — 3079F DIAST BP 80-89 MM HG: CPT | Mod: CPTII,,, | Performed by: ADVANCED PRACTICE MIDWIFE

## 2025-08-14 PROCEDURE — 87389 HIV-1 AG W/HIV-1&-2 AB AG IA: CPT | Mod: ,,, | Performed by: CLINICAL MEDICAL LABORATORY

## 2025-08-14 PROCEDURE — 99213 OFFICE O/P EST LOW 20 MIN: CPT | Mod: ,,, | Performed by: ADVANCED PRACTICE MIDWIFE

## 2025-08-14 PROCEDURE — 81515 NFCT DS BV&VAGINITIS DNA ALG: CPT | Mod: QW,,, | Performed by: CLINICAL MEDICAL LABORATORY

## 2025-08-14 PROCEDURE — 1159F MED LIST DOCD IN RCRD: CPT | Mod: CPTII,,, | Performed by: ADVANCED PRACTICE MIDWIFE

## 2025-08-14 PROCEDURE — 3077F SYST BP >= 140 MM HG: CPT | Mod: CPTII,,, | Performed by: ADVANCED PRACTICE MIDWIFE

## 2025-08-14 PROCEDURE — 87591 N.GONORRHOEAE DNA AMP PROB: CPT | Mod: ,,, | Performed by: CLINICAL MEDICAL LABORATORY

## 2025-08-14 PROCEDURE — 87491 CHLMYD TRACH DNA AMP PROBE: CPT | Mod: ,,, | Performed by: CLINICAL MEDICAL LABORATORY

## 2025-08-14 PROCEDURE — 81025 URINE PREGNANCY TEST: CPT | Mod: QW,,, | Performed by: ADVANCED PRACTICE MIDWIFE

## 2025-08-15 DIAGNOSIS — B37.31 YEAST VAGINITIS: ICD-10-CM

## 2025-08-15 DIAGNOSIS — N76.0 BACTERIAL VAGINAL INFECTION: Primary | ICD-10-CM

## 2025-08-15 DIAGNOSIS — B96.89 BACTERIAL VAGINAL INFECTION: Primary | ICD-10-CM

## 2025-08-15 LAB
CHLAMYDIA BY PCR: NEGATIVE
N. GONORRHOEAE (GC) BY PCR: NEGATIVE
RPR SER-TITR: ABNORMAL {TITER}

## 2025-08-15 RX ORDER — FLUCONAZOLE 150 MG/1
150 TABLET ORAL
Qty: 2 TABLET | Refills: 0 | Status: SHIPPED | OUTPATIENT
Start: 2025-08-15 | End: 2025-08-23

## 2025-08-15 RX ORDER — METRONIDAZOLE 500 MG/1
500 TABLET ORAL 2 TIMES DAILY
Qty: 14 TABLET | Refills: 0 | Status: SHIPPED | OUTPATIENT
Start: 2025-08-15 | End: 2025-08-22

## 2025-08-24 ENCOUNTER — HOSPITAL ENCOUNTER (EMERGENCY)
Facility: HOSPITAL | Age: 25
Discharge: HOME | End: 2025-08-24

## 2025-08-24 VITALS
BODY MASS INDEX: 26.31 KG/M2 | DIASTOLIC BLOOD PRESSURE: 86 MMHG | HEIGHT: 62 IN | WEIGHT: 143 LBS | SYSTOLIC BLOOD PRESSURE: 141 MMHG | RESPIRATION RATE: 18 BRPM | HEART RATE: 81 BPM | TEMPERATURE: 98 F | OXYGEN SATURATION: 100 %

## 2025-08-24 DIAGNOSIS — M79.672 LEFT FOOT PAIN: ICD-10-CM

## 2025-08-24 LAB — URATE SERPL-MCNC: 5.6 MG/DL (ref 2.6–6)

## 2025-08-24 PROCEDURE — 96372 THER/PROPH/DIAG INJ SC/IM: CPT

## 2025-08-24 PROCEDURE — 36415 COLL VENOUS BLD VENIPUNCTURE: CPT

## 2025-08-24 PROCEDURE — 63600175 PHARM REV CODE 636 W HCPCS: Mod: UD

## 2025-08-24 PROCEDURE — 84550 ASSAY OF BLOOD/URIC ACID: CPT

## 2025-08-24 PROCEDURE — 99284 EMERGENCY DEPT VISIT MOD MDM: CPT | Mod: 25

## 2025-08-24 RX ORDER — IBUPROFEN 800 MG/1
800 TABLET, FILM COATED ORAL 3 TIMES DAILY
Qty: 30 TABLET | Refills: 0 | Status: SHIPPED | OUTPATIENT
Start: 2025-08-24 | End: 2025-09-03

## 2025-08-24 RX ORDER — KETOROLAC TROMETHAMINE 30 MG/ML
30 INJECTION, SOLUTION INTRAMUSCULAR; INTRAVENOUS
Status: COMPLETED | OUTPATIENT
Start: 2025-08-24 | End: 2025-08-24

## 2025-08-24 RX ADMIN — KETOROLAC TROMETHAMINE 30 MG: 30 INJECTION, SOLUTION INTRAMUSCULAR; INTRAVENOUS at 09:08
